# Patient Record
Sex: FEMALE | Race: WHITE | NOT HISPANIC OR LATINO | Employment: OTHER | URBAN - NONMETROPOLITAN AREA
[De-identification: names, ages, dates, MRNs, and addresses within clinical notes are randomized per-mention and may not be internally consistent; named-entity substitution may affect disease eponyms.]

---

## 2018-05-22 ENCOUNTER — OFFICE VISIT (OUTPATIENT)
Dept: URGENT CARE | Facility: CLINIC | Age: 63
End: 2018-05-22
Payer: COMMERCIAL

## 2018-05-22 VITALS
HEIGHT: 62 IN | HEART RATE: 73 BPM | DIASTOLIC BLOOD PRESSURE: 77 MMHG | TEMPERATURE: 96.7 F | BODY MASS INDEX: 20.32 KG/M2 | WEIGHT: 110.4 LBS | SYSTOLIC BLOOD PRESSURE: 144 MMHG | OXYGEN SATURATION: 98 %
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
## 2018-05-22 PROCEDURE — 10120 INC&RMVL FB SUBQ TISS SMPL: CPT | Performed by: NURSE PRACTITIONER

## 2018-05-22 PROCEDURE — 99203 OFFICE O/P NEW LOW 30 MIN: CPT | Performed by: NURSE PRACTITIONER

## 2018-05-22 RX ORDER — DOXYCYCLINE 100 MG/1
TABLET ORAL
Qty: 2 TABLET | Refills: 0 | Status: SHIPPED | OUTPATIENT
Start: 2018-05-22 | End: 2018-05-23

## 2018-05-22 NOTE — PROGRESS NOTES
[facility]        NAME: Dalton Mcfarland is a 58 y o  female  : 1955    MRN: [id number]  DATE: May 22, 2018  TIME: 4:24 PM    Assessment and Plan   Tick bite, initial encounter [W57  XXXA]  1  Tick bite, initial encounter  doxycycline (ADOXA) 100 MG tablet         Patient Instructions     Given prophylactic dose of doxycycline and instructed to follow up with PCP for Lyme titers in 3 weeks otherwise follow up PCP in 3-5 days  Follow up with PCP in 3-5 days  Proceed to  ER if symptoms worsen  Chief Complaint     Chief Complaint   Patient presents with    Tick Removal     behind left knee         History of Present Illness       44-year-old female presents urgent care with chief complaint of tick bite left posterior calf  Patient states take has been in since this morning so approximately 8 hours  Patient states she did attempt to removed tick and have broken off with partial tick remaining and leg  Denies any fevers chills body aches or rashes at this time  Review of Systems   Review of Systems   Constitutional: Negative  HENT: Negative  Eyes: Negative  Respiratory: Negative  Cardiovascular: Negative  Gastrointestinal: Negative  Endocrine: Negative  Genitourinary: Negative  Musculoskeletal: Negative  Skin: Positive for wound (tick left lower leg)  Allergic/Immunologic: Negative  Neurological: Negative  Hematological: Negative  Psychiatric/Behavioral: Negative  All other systems reviewed and are negative          Current Medications       Current Outpatient Prescriptions:     doxycycline (ADOXA) 100 MG tablet, Take 200mg 1 time dose only, Disp: 2 tablet, Rfl: 0    Current Allergies     Allergies as of 2018 - Reviewed 2018   Allergen Reaction Noted    Neomycin Rash 2018            The following portions of the patient's history were reviewed and updated as appropriate: allergies, current medications, past family history, past medical history, past social history, past surgical history and problem list      History reviewed  No pertinent past medical history  Past Surgical History:   Procedure Laterality Date    SMALL INTESTINE SURGERY         Family History   Problem Relation Age of Onset    No Known Problems Mother     No Known Problems Father          Medications have been verified  Objective   /77 (BP Location: Right arm, Patient Position: Sitting, Cuff Size: Standard)   Pulse 73   Temp (!) 96 7 °F (35 9 °C) (Tympanic)   Ht 5' 1 81" (1 57 m)   Wt 50 1 kg (110 lb 6 4 oz)   SpO2 98%   BMI 20 32 kg/m²        Physical Exam     Physical Exam   Constitutional: She is oriented to person, place, and time  Vital signs are normal  She appears well-developed  HENT:   Head: Normocephalic and atraumatic  Right Ear: External ear normal    Left Ear: External ear normal    Nose: Nose normal    Mouth/Throat: Oropharynx is clear and moist    Eyes: Conjunctivae and EOM are normal  Pupils are equal, round, and reactive to light  Lids are everted and swept, no foreign bodies found  Neck: Normal range of motion  Neck supple  Cardiovascular: Normal rate, regular rhythm, normal heart sounds and intact distal pulses  Pulmonary/Chest: Effort normal and breath sounds normal    Abdominal: Normal appearance  Musculoskeletal: Normal range of motion  Neurological: She is alert and oriented to person, place, and time  She has normal reflexes  Skin: Skin is warm, dry and intact  Psychiatric: She has a normal mood and affect  Her speech is normal and behavior is normal  Judgment and thought content normal    Nursing note and vitals reviewed

## 2018-05-22 NOTE — PATIENT INSTRUCTIONS
Given prophylactic dose of doxycycline provided wound care instructions for patient who verbalizes understanding and instructed to follow up with PCP for Lyme titers in 3 weeks

## 2019-03-25 ENCOUNTER — OFFICE VISIT (OUTPATIENT)
Dept: URGENT CARE | Facility: CLINIC | Age: 64
End: 2019-03-25
Payer: COMMERCIAL

## 2019-03-25 VITALS
WEIGHT: 110 LBS | OXYGEN SATURATION: 99 % | TEMPERATURE: 96.9 F | HEIGHT: 63 IN | SYSTOLIC BLOOD PRESSURE: 144 MMHG | RESPIRATION RATE: 20 BRPM | HEART RATE: 78 BPM | DIASTOLIC BLOOD PRESSURE: 86 MMHG | BODY MASS INDEX: 19.49 KG/M2

## 2019-03-25 DIAGNOSIS — H57.89 IRRITATION OF LEFT EYE: Primary | ICD-10-CM

## 2019-03-25 PROCEDURE — 99213 OFFICE O/P EST LOW 20 MIN: CPT | Performed by: NURSE PRACTITIONER

## 2019-03-25 NOTE — PROGRESS NOTES
[facility]        NAME: Cameron Poole is a 61 y o  female  : 1955    MRN: [id number]  DATE: 2019  TIME: 8:12 PM    Assessment and Plan   Irritation of left eye [H57 89]  1  Irritation of left eye           Patient Instructions   Continue using over-the-counter eyedrops p r n  Nikki Skipper Keep your follow-up appointment with the eye doctor next Monday  If you experience a significant change in symptoms such as decreased vision or increase in discomfort/pain, you should be rechecked  Follow up with PCP in 3-5 days  Proceed to  ER if symptoms worsen  Chief Complaint     Chief Complaint   Patient presents with    Eye Problem     redness and swelling of left eye for 1 5 weeks         History of Present Illness       Patient reports that her left eye has been bothering her for a week and a half  She and her  states that she has an eye appointment for next Monday but that was the soonest she can be seen  She states that initially it felt like something was in her, but now it just feels irritated  She reports that 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  She states that the eye and the tissue surrounding her eye feel swollen at present time  She reports that she has been using over-the-counter eyedrops with partial relief  She and her  states she has been monitored by her eye doctor for fluctuating eye pressures  She reports that some mornings she has a small amount of clearish white crusted discharge on her eye, but denies eye drainage throughout the day  She states that initially her eye was more reddened, but that is not now  She reports at times her vision seems blurry (on assessment here her vision with glasses on was within normal limits--R eye 20/25 and L eye 20/20)  She expresses concern regarding infection  Review of Systems   Review of Systems   HENT: Negative for congestion, postnasal drip, rhinorrhea, sinus pressure and sinus pain  Eyes: Positive for pain (swollen feeling) and discharge  Negative for photophobia, redness, itching and visual disturbance  Neurological: Negative for dizziness, weakness, light-headedness, numbness and headaches  Current Medications     No current outpatient medications on file  Current Allergies     Allergies as of 03/25/2019 - Reviewed 03/25/2019   Allergen Reaction Noted    Neomycin Rash 05/22/2018            The following portions of the patient's history were reviewed and updated as appropriate: allergies, current medications, past family history, past medical history, past social history, past surgical history and problem list      History reviewed  No pertinent past medical history  Past Surgical History:   Procedure Laterality Date    SMALL INTESTINE SURGERY      TUBAL LIGATION         Family History   Problem Relation Age of Onset    No Known Problems Mother     No Known Problems Father          Medications have been verified  Objective   /86   Pulse 78   Temp (!) 96 9 °F (36 1 °C) (Tympanic)   Resp 20   Ht 5' 3" (1 6 m)   Wt 49 9 kg (110 lb)   SpO2 99%   BMI 19 49 kg/m²        Physical Exam     Physical Exam   Constitutional: She is oriented to person, place, and time  She appears well-developed and well-nourished  No distress  HENT:   Head: Normocephalic and atraumatic  Right Ear: External ear normal    Left Ear: External ear normal    Nose: Nose normal    Mouth/Throat: No oropharyngeal exudate  Eyes: Pupils are equal, round, and reactive to light  Conjunctivae and lids are normal  Right eye exhibits no chemosis, no discharge, no exudate and no hordeolum  No foreign body present in the right eye  Left eye exhibits no chemosis, no discharge, no exudate and no hordeolum  No foreign body present in the left eye  Right conjunctiva is not injected  Right conjunctiva has no hemorrhage  Left conjunctiva is not injected  Left conjunctiva has no hemorrhage   No scleral icterus  Right eye exhibits nystagmus  Left eye exhibits nystagmus  Mild bilateral nystagmus noted  Patient denies dizziness or vertigo symptoms  Small, approximately 2 mm x 1 mm superficial reddened area on on left lower eyelid near lateral canthus  Patient does not remember if she had a stye here or if this area was previously more swollen  It looks mildly irritated; it is difficult to say whether this caused by irritation or if possible rubbing at her sleep caused this spot  With her eye closed, I very gently and slowly applied pressure  Patient did not note any change in the eye discomfort; no pain was elicited  No symptoms of active infection conjunctiva white, no drainage   Neck: Normal range of motion  Neck supple  Pulmonary/Chest: Effort normal  No respiratory distress  Abdominal: She exhibits no distension  Musculoskeletal: Normal range of motion  Neurological: She is alert and oriented to person, place, and time  Skin: Skin is warm and dry  She is not diaphoretic  Psychiatric: She has a normal mood and affect  Her behavior is normal  Judgment and thought content normal    Nursing note and vitals reviewed

## 2019-03-26 NOTE — PATIENT INSTRUCTIONS
Continue using over-the-counter eyedrops p r n Dara Soulier Keep your follow-up appointment with the eye doctor next Monday  If you experience a significant change in symptoms such as decreased vision or increase in discomfort/pain, you should be rechecked

## 2019-11-27 ENCOUNTER — OFFICE VISIT (OUTPATIENT)
Dept: URGENT CARE | Facility: CLINIC | Age: 64
End: 2019-11-27
Payer: COMMERCIAL

## 2019-11-27 ENCOUNTER — APPOINTMENT (OUTPATIENT)
Dept: RADIOLOGY | Facility: CLINIC | Age: 64
End: 2019-11-27
Payer: COMMERCIAL

## 2019-11-27 VITALS
RESPIRATION RATE: 18 BRPM | HEIGHT: 63 IN | DIASTOLIC BLOOD PRESSURE: 81 MMHG | WEIGHT: 110 LBS | TEMPERATURE: 98.6 F | BODY MASS INDEX: 19.49 KG/M2 | OXYGEN SATURATION: 99 % | HEART RATE: 88 BPM | SYSTOLIC BLOOD PRESSURE: 161 MMHG

## 2019-11-27 DIAGNOSIS — M79.672 LEFT FOOT PAIN: ICD-10-CM

## 2019-11-27 DIAGNOSIS — S90.32XA CONTUSION OF LEFT FOOT, INITIAL ENCOUNTER: Primary | ICD-10-CM

## 2019-11-27 PROCEDURE — 99213 OFFICE O/P EST LOW 20 MIN: CPT | Performed by: PHYSICIAN ASSISTANT

## 2019-11-27 PROCEDURE — 73630 X-RAY EXAM OF FOOT: CPT

## 2019-11-27 NOTE — PATIENT INSTRUCTIONS
No acute abnormality of Xray  Post op shoe given to be used as much as tolerated when weight bearing  Recommend rest, icing for 20 minutes at a time at least 4x daily, compression when possible, and elevating as much as possible  Take anti inflammatory such as advil or aleve to help with inflammation and discomfort  F/u with PCP if no improvement in 2-3 weeks

## 2019-11-27 NOTE — PROGRESS NOTES
[facility]        NAME: Ryan Doran is a 61 y o  female  : 1955    MRN: [id number]  DATE: 2019  TIME: 10:18 AM    Assessment and Plan   Contusion of left foot, initial encounter [S90 32XA]  1  Contusion of left foot, initial encounter  XR foot 3+ vw left    CANCELED: Post Op Shoe         Patient Instructions     Patient Instructions   No acute abnormality of Xray  Post op shoe given to be used as much as tolerated when weight bearing  Recommend rest, icing for 20 minutes at a time at least 4x daily, compression when possible, and elevating as much as possible  Take anti inflammatory such as advil or aleve to help with inflammation and discomfort  F/u with PCP if no improvement in 2-3 weeks    Called patient and left message regarding sclerosis noted by radiologist of 4th middle phalanx which could represent a slightly impacted fracture but of unknown age  Doubt acute fracture as this is not the area of her pain, recommended ignacio taping if she notes discomfort  Management otherwise would be the same  Follow up with PCP in 3-5 days  Proceed to  ER if symptoms worsen  Chief Complaint     Chief Complaint   Patient presents with    Foot Injury     left foot was bent back stepping of a fork lift a week ago          History of Present Illness       60 y/o F presents c/o L foot injury that occurred 6 days ago  States she was wearing steel toe boots 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, stepped down on toe causing boot to fold and steel tip to dig into top of foot  Did not fall  Has had pain since, not improving  Is able to wear weight  Pain is mostly on 2nd and 3rd toes as well as distal dorsal foot  Occasional tingling of area  Has been using ice and advil with some relief  No swelling or bruising      Review of Systems   Review of Systems   Constitutional: Negative for chills, fatigue and fever  Respiratory: Negative for cough and shortness of breath      Cardiovascular: Negative for chest pain and palpitations  Musculoskeletal: Positive for arthralgias and gait problem  Negative for joint swelling and myalgias  Skin: Negative for color change, pallor and rash  Neurological: Negative for weakness, light-headedness and numbness  Current Medications     No current outpatient medications on file  Current Allergies     Allergies as of 11/27/2019 - Reviewed 11/27/2019   Allergen Reaction Noted    Neomycin Rash 05/22/2018            The following portions of the patient's history were reviewed and updated as appropriate: allergies, current medications, past family history, past medical history, past social history, past surgical history and problem list      History reviewed  No pertinent past medical history  Past Surgical History:   Procedure Laterality Date    SMALL INTESTINE SURGERY      SMALL INTESTINE SURGERY      TUBAL LIGATION         Family History   Problem Relation Age of Onset    No Known Problems Mother     No Known Problems Father          Medications have been verified  Objective   /81   Pulse 88   Temp 98 6 °F (37 °C)   Resp 18   Ht 5' 3" (1 6 m)   Wt 49 9 kg (110 lb)   SpO2 99%   BMI 19 49 kg/m²        Physical Exam     Physical Exam   Constitutional: She appears well-developed and well-nourished  No distress  Musculoskeletal:   Tenderness of L 2nd and 3rd toes as well as distal dorsal foot  No swelling, heat or bruising  Sensation intact  Cap refill <2s of all digits  Able to wiggle toes  DP pulse 2+   Skin: She is not diaphoretic

## 2019-12-03 ENCOUNTER — TELEPHONE (OUTPATIENT)
Dept: OBGYN CLINIC | Facility: HOSPITAL | Age: 64
End: 2019-12-03

## 2019-12-03 ENCOUNTER — OFFICE VISIT (OUTPATIENT)
Dept: FAMILY MEDICINE CLINIC | Facility: CLINIC | Age: 64
End: 2019-12-03
Payer: COMMERCIAL

## 2019-12-03 VITALS
HEART RATE: 88 BPM | BODY MASS INDEX: 19.45 KG/M2 | WEIGHT: 109.8 LBS | HEIGHT: 63 IN | DIASTOLIC BLOOD PRESSURE: 90 MMHG | SYSTOLIC BLOOD PRESSURE: 170 MMHG | OXYGEN SATURATION: 97 %

## 2019-12-03 DIAGNOSIS — S92.902D CLOSED FRACTURE OF LEFT FOOT WITH ROUTINE HEALING, SUBSEQUENT ENCOUNTER: Primary | ICD-10-CM

## 2019-12-03 DIAGNOSIS — Z13.31 DEPRESSION SCREENING NEGATIVE: ICD-10-CM

## 2019-12-03 DIAGNOSIS — Z11.59 NEED FOR HEPATITIS C SCREENING TEST: ICD-10-CM

## 2019-12-03 DIAGNOSIS — R03.0 ELEVATED BLOOD PRESSURE READING: ICD-10-CM

## 2019-12-03 PROCEDURE — 4004F PT TOBACCO SCREEN RCVD TLK: CPT | Performed by: PHYSICIAN ASSISTANT

## 2019-12-03 PROCEDURE — 3008F BODY MASS INDEX DOCD: CPT | Performed by: PHYSICIAN ASSISTANT

## 2019-12-03 PROCEDURE — 99203 OFFICE O/P NEW LOW 30 MIN: CPT | Performed by: PHYSICIAN ASSISTANT

## 2019-12-03 NOTE — PROGRESS NOTES
Assessment/Plan:    Problem List Items Addressed This Visit     None      Visit Diagnoses     Closed fracture of left foot with routine healing, subsequent encounter    -  Primary    Relevant Orders    Ambulatory referral to Podiatry    Elevated blood pressure reading        Need for hepatitis C screening test        Relevant Orders    Hepatitis C antibody    Depression screening negative               Diagnoses and all orders for this visit:    Closed fracture of left foot with routine healing, subsequent encounter  -     Ambulatory referral to Podiatry; Future    Elevated blood pressure reading    Need for hepatitis C screening test  -     Hepatitis C antibody    Depression screening negative        I've placed some general restrictions on [place] for her employer, but will ultimately defer to podiatry  I've placed a referral to Dr Alexanrda Patel  As for her elevated blood pressure, I've asked Michelle Billingsley to stop in office next week for BP check  If continues to be elevated, would recommend HTN follow up appointment and would initiate anti-hypertensive medication  She does not have a BP cuff at home currently  Subjective:      Patient ID: Yuniel Capone is a 61 y o  female  Michelle Billingsley is here today as a new patient due to injury sustained at work 11/21/19  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, and top of steel-toe boots pushed onto top of foot, causing pain  Was evaluated by work nurse who felt it was bruising  She did not follow up with work physician  Symptoms did not improve (pain), so she was evaluated at [facility] 11/27/19  Xray of L foot shows possible fracture of L 4th middle phalanx, age indeterminate  Michelle Billingsley feels that currently, she is unable to safely work at full duty  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  At times, she is required to lift/carry 50 lbs and is on her feet for her entire shift   These activities all give her increased pain and swelling in L foot  Yuri Maki is asking for some work limitations and hopefully will be provided with light duty accommodations until healed, also requesting evaluation by specialist   As a side note while here, she is found to be hypertensive  Per patient, she is irritated/aggitated by her employer and is in pain with her foot  She feels that is why her BP is elevated  Was never hypertensive previously per patient  The following portions of the patient's history were reviewed and updated as appropriate:   She has no past medical history on file  ,  does not have any pertinent problems on file  ,   has a past surgical history that includes Small intestine surgery; Tubal ligation; and Small intestine surgery  ,  family history includes No Known Problems in her father and mother  ,   reports that she has been smoking  She has never used smokeless tobacco  She reports that she drinks alcohol  She reports that she does not use drugs  ,  is allergic to neomycin     No current outpatient medications on file  No current facility-administered medications for this visit  Review of Systems   Constitutional: Negative for activity change, appetite change, chills, diaphoresis, fatigue, fever and unexpected weight change  HENT: Negative for congestion, ear pain, postnasal drip, rhinorrhea, sinus pressure, sinus pain, sneezing, sore throat, tinnitus and voice change  Eyes: Negative for pain, redness and visual disturbance  Respiratory: Negative for cough, chest tightness, shortness of breath and wheezing  Cardiovascular: Negative for chest pain, palpitations and leg swelling  Gastrointestinal: Negative for abdominal pain, blood in stool, constipation, diarrhea, nausea and vomiting  Genitourinary: Negative for difficulty urinating, dysuria, frequency, hematuria and urgency  Musculoskeletal: Positive for arthralgias and gait problem  Negative for back pain, joint swelling, myalgias, neck pain and neck stiffness  Skin: Negative for color change, pallor, rash and wound  Neurological: Negative for dizziness, tremors, weakness, light-headedness and headaches  Psychiatric/Behavioral: Negative for dysphoric mood, self-injury, sleep disturbance and suicidal ideas  The patient is not nervous/anxious  Objective:  Vitals:    12/03/19 0903 12/03/19 0930   BP: (!) 178/88 170/90   BP Location: Left arm    Patient Position: Sitting    Pulse: 88    SpO2: 97%    Weight: 49 8 kg (109 lb 12 8 oz)    Height: 5' 3" (1 6 m)      Body mass index is 19 45 kg/m²  Physical Exam   Constitutional: She is oriented to person, place, and time  She appears well-developed and well-nourished  No distress  HENT:   Head: Normocephalic and atraumatic  Right Ear: Hearing, tympanic membrane, external ear and ear canal normal    Left Ear: Hearing, tympanic membrane, external ear and ear canal normal    Mouth/Throat: Uvula is midline, oropharynx is clear and moist and mucous membranes are normal  No oropharyngeal exudate  Eyes: Conjunctivae are normal  Right eye exhibits no discharge  Left eye exhibits no discharge  No scleral icterus  Neck: Neck supple  Carotid bruit is not present  No thyromegaly present  Cardiovascular: Normal rate, regular rhythm and normal heart sounds  No murmur heard  Pulmonary/Chest: Effort normal and breath sounds normal  No respiratory distress  She has no wheezes  Abdominal: Soft  Bowel sounds are normal  She exhibits no distension and no mass  There is no tenderness  There is no rebound and no guarding  Musculoskeletal: Normal range of motion  She exhibits no edema  Left foot: There is tenderness  Feet:    Some bruising present over area of tenderness, no swelling on exam   Lymphadenopathy:     She has no cervical adenopathy  Neurological: She is alert and oriented to person, place, and time  Skin: Skin is warm and dry  No rash noted  She is not diaphoretic  No erythema  Psychiatric: She has a normal mood and affect  Her behavior is normal  Judgment and thought content normal    Vitals reviewed  PHQ-9 Depression Screening    PHQ-9:    Frequency of the following problems over the past two weeks:       Little interest or pleasure in doing things:  0 - not at all  Feeling down, depressed, or hopeless:  0 - not at all  PHQ-2 Score:  0         Tobacco Cessation Counseling: Tobacco cessation counseling and education was provided  The patient is sincerely urged to quit consumption of tobacco  She is not ready to quit tobacco  The numerous health risks of tobacco consumption were discussed  If she decides to quit, there are a number of helpful adjunctive aids, and she can see me to discuss nicotine replacement therapy, chantix, or bupropion anytime in the future

## 2019-12-03 NOTE — TELEPHONE ENCOUNTER
Patient lvm stating that she needed an appt asap for a fx 4th toe because her employer will not let her come back  I called the patient to schedule the appt & lvm for her to return our call

## 2019-12-03 NOTE — LETTER
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

## 2019-12-04 ENCOUNTER — TELEPHONE (OUTPATIENT)
Dept: FAMILY MEDICINE CLINIC | Facility: CLINIC | Age: 64
End: 2019-12-04

## 2019-12-04 NOTE — TELEPHONE ENCOUNTER
THIS HAPPENED AT WORK  PT WAS NOT TOLD THE PROPER CHANNELS IN THE BEGINNING  NOW THEY TOLD HER SHE HAS TO SEE THE WORKMAN'S COMP DOCTOR FOR AT LEAST 80 DAYS, AFTER THAT SHE CAN GO WHEREVER SHE WANTS  IF SHE IS NOT SATISFIED AFTER THE 90 DAYS SHE WILL CALL DR Merlene Gamino OFFICE FOR APPT   IT'S NOT THAT SHE DID NOT WANT TO GO

## 2020-02-06 ENCOUNTER — TELEPHONE (OUTPATIENT)
Dept: FAMILY MEDICINE CLINIC | Facility: CLINIC | Age: 65
End: 2020-02-06

## 2020-02-11 ENCOUNTER — APPOINTMENT (OUTPATIENT)
Dept: LAB | Facility: CLINIC | Age: 65
End: 2020-02-11
Payer: COMMERCIAL

## 2020-02-11 ENCOUNTER — OFFICE VISIT (OUTPATIENT)
Dept: FAMILY MEDICINE CLINIC | Facility: CLINIC | Age: 65
End: 2020-02-11
Payer: COMMERCIAL

## 2020-02-11 VITALS
DIASTOLIC BLOOD PRESSURE: 82 MMHG | WEIGHT: 109.8 LBS | HEART RATE: 73 BPM | HEIGHT: 63 IN | SYSTOLIC BLOOD PRESSURE: 122 MMHG | BODY MASS INDEX: 19.45 KG/M2 | TEMPERATURE: 97 F | OXYGEN SATURATION: 97 %

## 2020-02-11 DIAGNOSIS — Z12.31 ENCOUNTER FOR SCREENING MAMMOGRAM FOR BREAST CANCER: ICD-10-CM

## 2020-02-11 DIAGNOSIS — J01.90 ACUTE NON-RECURRENT SINUSITIS, UNSPECIFIED LOCATION: Primary | ICD-10-CM

## 2020-02-11 DIAGNOSIS — Z13.31 DEPRESSION SCREENING NEGATIVE: ICD-10-CM

## 2020-02-11 DIAGNOSIS — Z12.11 SCREENING FOR COLON CANCER: ICD-10-CM

## 2020-02-11 PROBLEM — S92.912D CLOSED NONDISPLACED FRACTURE OF PHALANX OF TOE OF LEFT FOOT WITH ROUTINE HEALING: Status: ACTIVE | Noted: 2019-12-05

## 2020-02-11 PROBLEM — S90.122A CONTUSION OF FOURTH TOE OF LEFT FOOT: Status: ACTIVE | Noted: 2019-12-05

## 2020-02-11 PROBLEM — S93.525A: Status: ACTIVE | Noted: 2019-12-19

## 2020-02-11 LAB — HCV AB SER QL: NORMAL

## 2020-02-11 PROCEDURE — 99214 OFFICE O/P EST MOD 30 MIN: CPT | Performed by: PHYSICIAN ASSISTANT

## 2020-02-11 PROCEDURE — 3008F BODY MASS INDEX DOCD: CPT | Performed by: PHYSICIAN ASSISTANT

## 2020-02-11 PROCEDURE — 4004F PT TOBACCO SCREEN RCVD TLK: CPT | Performed by: PHYSICIAN ASSISTANT

## 2020-02-11 PROCEDURE — 86803 HEPATITIS C AB TEST: CPT | Performed by: PHYSICIAN ASSISTANT

## 2020-02-11 PROCEDURE — 36415 COLL VENOUS BLD VENIPUNCTURE: CPT | Performed by: PHYSICIAN ASSISTANT

## 2020-02-11 RX ORDER — AMOXICILLIN 500 MG/1
500 CAPSULE ORAL EVERY 8 HOURS SCHEDULED
Qty: 30 CAPSULE | Refills: 0 | Status: SHIPPED | OUTPATIENT
Start: 2020-02-11 | End: 2020-02-21

## 2020-02-11 NOTE — PROGRESS NOTES
Assessment/Plan:    Problem List Items Addressed This Visit     None      Visit Diagnoses     Acute non-recurrent sinusitis, unspecified location    -  Primary    Relevant Medications    amoxicillin (AMOXIL) 500 mg capsule    Screening for colon cancer        Relevant Orders    Occult Blood, Fecal Immunochemical    Encounter for screening mammogram for breast cancer        Relevant Orders    Mammo screening bilateral w 3d & cad    Depression screening negative               Diagnoses and all orders for this visit:    Acute non-recurrent sinusitis, unspecified location  -     amoxicillin (AMOXIL) 500 mg capsule; Take 1 capsule (500 mg total) by mouth every 8 (eight) hours for 10 days    Screening for colon cancer  -     Occult Blood, Fecal Immunochemical; Future    Encounter for screening mammogram for breast cancer  -     Mammo screening bilateral w 3d & cad; Future    Depression screening negative        No problem-specific Assessment & Plan notes found for this encounter  Subjective:      Patient ID: Rafael Low is a 59 y o  female  P[employer]illa [employer] is here today complaining of sinus symptoms x 2 weeks  The following portions of the patient's history were reviewed and updated as appropriate:   She has no past medical history on file  ,  does not have any pertinent problems on file  ,   has a past surgical history that includes Small intestine surgery; Tubal ligation; and Small intestine surgery  ,  family history includes No Known Problems in her father and mother  ,   reports that she has been smoking  She has never used smokeless tobacco  She reports that she drinks alcohol  She reports that she does not use drugs  ,  is allergic to neomycin     Current Outpatient Medications   Medication Sig Dispense Refill    amoxicillin (AMOXIL) 500 mg capsule Take 1 capsule (500 mg total) by mouth every 8 (eight) hours for 10 days 30 capsule 0     No current facility-administered medications for this visit          Review of Systems   Constitutional: Negative for activity change, appetite change, chills, diaphoresis, fatigue, fever and unexpected weight change  HENT: Positive for sinus pressure and sinus pain  Negative for congestion, ear pain, postnasal drip, rhinorrhea, sneezing, sore throat, tinnitus and voice change  Eyes: Negative for pain, redness and visual disturbance  Respiratory: Negative for cough, chest tightness, shortness of breath and wheezing  Cardiovascular: Negative for chest pain, palpitations and leg swelling  Gastrointestinal: Negative for abdominal pain, blood in stool, constipation, diarrhea, nausea and vomiting  Genitourinary: Negative for difficulty urinating, dysuria, frequency, hematuria and urgency  Musculoskeletal: Negative for arthralgias, back pain, gait problem, joint swelling, myalgias, neck pain and neck stiffness  Skin: Negative for color change, pallor, rash and wound  Neurological: Negative for dizziness, tremors, weakness, light-headedness and headaches  Psychiatric/Behavioral: Negative for dysphoric mood, self-injury, sleep disturbance and suicidal ideas  The patient is not nervous/anxious  Objective:  Vitals:    02/11/20 0822 02/11/20 0850   BP: 150/86 122/82   BP Location: Left arm    Patient Position: Sitting    Pulse: 73    Temp: (!) 97 °F (36 1 °C)    SpO2: 97%    Weight: 49 8 kg (109 lb 12 8 oz)    Height: 5' 3" (1 6 m)      Body mass index is 19 45 kg/m²  PHQ-9 Depression Screening    PHQ-9:    Frequency of the following problems over the past two weeks:       Little interest or pleasure in doing things:  0 - not at all  Feeling down, depressed, or hopeless:  0 - not at all  PHQ-2 Score:  0          Physical Exam   Constitutional: She is oriented to person, place, and time  She appears well-developed and well-nourished  No distress  HENT:   Head: Normocephalic and atraumatic     Right Ear: Hearing, tympanic membrane, external ear and ear canal normal  Left Ear: Hearing, tympanic membrane, external ear and ear canal normal    Nose: Mucosal edema and rhinorrhea present  Mouth/Throat: Uvula is midline, oropharynx is clear and moist and mucous membranes are normal  No oropharyngeal exudate  No tonsillar exudate  Eyes: Conjunctivae are normal  Right eye exhibits no discharge  Left eye exhibits no discharge  No scleral icterus  Neck: Neck supple  Carotid bruit is not present  No thyromegaly present  Cardiovascular: Normal rate, regular rhythm and normal heart sounds  No murmur heard  Pulmonary/Chest: Effort normal and breath sounds normal  No respiratory distress  She has no wheezes  Abdominal: Soft  Bowel sounds are normal  She exhibits no distension and no mass  There is no tenderness  There is no rebound and no guarding  Musculoskeletal: Normal range of motion  She exhibits no edema or tenderness  Lymphadenopathy:     She has no cervical adenopathy  Neurological: She is alert and oriented to person, place, and time  Skin: Skin is warm and dry  No rash noted  She is not diaphoretic  No erythema  Psychiatric: She has a normal mood and affect  Her behavior is normal  Judgment and thought content normal    Vitals reviewed

## 2020-03-02 ENCOUNTER — HOSPITAL ENCOUNTER (OUTPATIENT)
Dept: MAMMOGRAPHY | Facility: HOSPITAL | Age: 65
Discharge: HOME | End: 2020-03-02
Payer: COMMERCIAL

## 2020-03-02 VITALS — WEIGHT: 109 LBS | BODY MASS INDEX: 19.31 KG/M2 | HEIGHT: 63 IN

## 2020-03-02 DIAGNOSIS — Z12.31 ENCOUNTER FOR SCREENING MAMMOGRAM FOR BREAST CANCER: ICD-10-CM

## 2020-03-02 DIAGNOSIS — R92.8 ABNORMAL MAMMOGRAM OF LEFT BREAST: Primary | ICD-10-CM

## 2020-03-02 PROCEDURE — 77067 SCR MAMMO BI INCL CAD: CPT

## 2020-03-02 PROCEDURE — 77063 BREAST TOMOSYNTHESIS BI: CPT

## 2020-08-20 ENCOUNTER — HOSPITAL ENCOUNTER (OUTPATIENT)
Dept: ULTRASOUND IMAGING | Facility: HOSPITAL | Age: 65
Discharge: HOME | End: 2020-08-20

## 2020-08-20 ENCOUNTER — HOSPITAL ENCOUNTER (OUTPATIENT)
Dept: MAMMOGRAPHY | Facility: HOSPITAL | Age: 65
Discharge: HOME | End: 2020-08-20
Payer: COMMERCIAL

## 2020-08-20 VITALS — WEIGHT: 109 LBS | HEIGHT: 63 IN | BODY MASS INDEX: 19.31 KG/M2

## 2020-08-20 DIAGNOSIS — R92.8 ABNORMAL MAMMOGRAM OF LEFT BREAST: ICD-10-CM

## 2020-08-20 PROCEDURE — 77065 DX MAMMO INCL CAD UNI: CPT

## 2020-08-20 PROCEDURE — G0279 TOMOSYNTHESIS, MAMMO: HCPCS

## 2020-10-28 ENCOUNTER — OFFICE VISIT (OUTPATIENT)
Dept: FAMILY MEDICINE CLINIC | Facility: CLINIC | Age: 65
End: 2020-10-28
Payer: COMMERCIAL

## 2020-10-28 VITALS
SYSTOLIC BLOOD PRESSURE: 146 MMHG | WEIGHT: 108.4 LBS | HEART RATE: 86 BPM | TEMPERATURE: 96 F | HEIGHT: 63 IN | OXYGEN SATURATION: 98 % | DIASTOLIC BLOOD PRESSURE: 84 MMHG | BODY MASS INDEX: 19.21 KG/M2

## 2020-10-28 DIAGNOSIS — Z72.0 TOBACCO ABUSE: ICD-10-CM

## 2020-10-28 DIAGNOSIS — R49.9 HOARSENESS OR CHANGING VOICE: Primary | ICD-10-CM

## 2020-10-28 PROCEDURE — 99213 OFFICE O/P EST LOW 20 MIN: CPT | Performed by: PHYSICIAN ASSISTANT

## 2020-10-28 PROCEDURE — 3725F SCREEN DEPRESSION PERFORMED: CPT | Performed by: PHYSICIAN ASSISTANT

## 2020-11-03 ENCOUNTER — OFFICE VISIT (OUTPATIENT)
Dept: OTOLARYNGOLOGY | Facility: CLINIC | Age: 65
End: 2020-11-03
Payer: COMMERCIAL

## 2020-11-03 ENCOUNTER — APPOINTMENT (OUTPATIENT)
Dept: LAB | Facility: MEDICAL CENTER | Age: 65
End: 2020-11-03
Payer: COMMERCIAL

## 2020-11-03 VITALS
OXYGEN SATURATION: 93 % | SYSTOLIC BLOOD PRESSURE: 144 MMHG | WEIGHT: 105 LBS | HEART RATE: 87 BPM | HEIGHT: 63 IN | TEMPERATURE: 97.1 F | BODY MASS INDEX: 18.61 KG/M2 | DIASTOLIC BLOOD PRESSURE: 80 MMHG

## 2020-11-03 DIAGNOSIS — R49.9 HOARSENESS OR CHANGING VOICE: ICD-10-CM

## 2020-11-03 DIAGNOSIS — Z72.0 TOBACCO ABUSE: ICD-10-CM

## 2020-11-03 DIAGNOSIS — J38.00 VOCAL CORD PARALYSIS: Primary | ICD-10-CM

## 2020-11-03 DIAGNOSIS — J38.00 VOCAL CORD PARALYSIS: ICD-10-CM

## 2020-11-03 LAB
ANION GAP SERPL CALCULATED.3IONS-SCNC: 2 MMOL/L (ref 4–13)
BUN SERPL-MCNC: 11 MG/DL (ref 5–25)
CALCIUM SERPL-MCNC: 9.1 MG/DL (ref 8.3–10.1)
CHLORIDE SERPL-SCNC: 108 MMOL/L (ref 100–108)
CO2 SERPL-SCNC: 28 MMOL/L (ref 21–32)
CREAT SERPL-MCNC: 0.8 MG/DL (ref 0.6–1.3)
GFR SERPL CREATININE-BSD FRML MDRD: 78 ML/MIN/1.73SQ M
GLUCOSE SERPL-MCNC: 118 MG/DL (ref 65–140)
POTASSIUM SERPL-SCNC: 4.5 MMOL/L (ref 3.5–5.3)
SODIUM SERPL-SCNC: 138 MMOL/L (ref 136–145)

## 2020-11-03 PROCEDURE — 99243 OFF/OP CNSLTJ NEW/EST LOW 30: CPT | Performed by: OTOLARYNGOLOGY

## 2020-11-03 PROCEDURE — 80048 BASIC METABOLIC PNL TOTAL CA: CPT

## 2020-11-03 PROCEDURE — 36415 COLL VENOUS BLD VENIPUNCTURE: CPT

## 2020-11-03 PROCEDURE — 31575 DIAGNOSTIC LARYNGOSCOPY: CPT | Performed by: OTOLARYNGOLOGY

## 2020-11-09 ENCOUNTER — HOSPITAL ENCOUNTER (OUTPATIENT)
Dept: CT IMAGING | Facility: HOSPITAL | Age: 65
Discharge: HOME | End: 2020-11-09
Payer: COMMERCIAL

## 2020-11-09 DIAGNOSIS — R49.9 HOARSENESS OR CHANGING VOICE: ICD-10-CM

## 2020-11-09 DIAGNOSIS — J38.00 VOCAL CORD PARALYSIS: ICD-10-CM

## 2020-11-09 DIAGNOSIS — Z72.0 TOBACCO ABUSE: ICD-10-CM

## 2020-11-09 PROCEDURE — 70491 CT SOFT TISSUE NECK W/DYE: CPT

## 2020-11-09 PROCEDURE — G1004 CDSM NDSC: HCPCS

## 2020-11-09 PROCEDURE — 71260 CT THORAX DX C+: CPT

## 2020-11-09 RX ADMIN — IOHEXOL 85 ML: 350 INJECTION, SOLUTION INTRAVENOUS at 15:20

## 2020-11-13 ENCOUNTER — OFFICE VISIT (OUTPATIENT)
Dept: OTOLARYNGOLOGY | Facility: CLINIC | Age: 65
End: 2020-11-13
Payer: COMMERCIAL

## 2020-11-13 VITALS
BODY MASS INDEX: 18.96 KG/M2 | SYSTOLIC BLOOD PRESSURE: 150 MMHG | DIASTOLIC BLOOD PRESSURE: 90 MMHG | HEART RATE: 88 BPM | TEMPERATURE: 97.6 F | OXYGEN SATURATION: 94 % | HEIGHT: 63 IN | WEIGHT: 107 LBS

## 2020-11-13 DIAGNOSIS — Z72.0 TOBACCO ABUSE: ICD-10-CM

## 2020-11-13 DIAGNOSIS — R49.9 HOARSENESS OR CHANGING VOICE: ICD-10-CM

## 2020-11-13 DIAGNOSIS — J38.00 VOCAL CORD PARALYSIS: Primary | ICD-10-CM

## 2020-11-13 DIAGNOSIS — R91.8 MASS OF LEFT LUNG: ICD-10-CM

## 2020-11-13 PROCEDURE — 99213 OFFICE O/P EST LOW 20 MIN: CPT | Performed by: OTOLARYNGOLOGY

## 2020-11-13 PROCEDURE — 3008F BODY MASS INDEX DOCD: CPT | Performed by: PHYSICIAN ASSISTANT

## 2020-11-13 PROCEDURE — 31575 DIAGNOSTIC LARYNGOSCOPY: CPT | Performed by: OTOLARYNGOLOGY

## 2020-11-16 ENCOUNTER — TELEPHONE (OUTPATIENT)
Dept: CARDIAC SURGERY | Facility: CLINIC | Age: 65
End: 2020-11-16

## 2020-11-20 ENCOUNTER — TELEPHONE (OUTPATIENT)
Dept: GYNECOLOGIC ONCOLOGY | Facility: CLINIC | Age: 65
End: 2020-11-20

## 2020-11-23 ENCOUNTER — TELEPHONE (OUTPATIENT)
Dept: CARDIAC SURGERY | Facility: CLINIC | Age: 65
End: 2020-11-23

## 2021-01-01 ENCOUNTER — IMMUNIZATIONS (OUTPATIENT)
Dept: FAMILY MEDICINE CLINIC | Facility: HOSPITAL | Age: 66
End: 2021-01-01

## 2021-01-01 ENCOUNTER — OFFICE VISIT (OUTPATIENT)
Dept: FAMILY MEDICINE CLINIC | Facility: CLINIC | Age: 66
End: 2021-01-01
Payer: MEDICARE

## 2021-01-01 ENCOUNTER — TELEPHONE (OUTPATIENT)
Dept: FAMILY MEDICINE CLINIC | Facility: CLINIC | Age: 66
End: 2021-01-01

## 2021-01-01 ENCOUNTER — OFFICE VISIT (OUTPATIENT)
Dept: URGENT CARE | Facility: CLINIC | Age: 66
End: 2021-01-01
Payer: MEDICARE

## 2021-01-01 VITALS
OXYGEN SATURATION: 99 % | TEMPERATURE: 98 F | DIASTOLIC BLOOD PRESSURE: 70 MMHG | HEIGHT: 63 IN | WEIGHT: 102 LBS | RESPIRATION RATE: 18 BRPM | SYSTOLIC BLOOD PRESSURE: 130 MMHG | BODY MASS INDEX: 18.07 KG/M2 | HEART RATE: 88 BPM

## 2021-01-01 VITALS
SYSTOLIC BLOOD PRESSURE: 122 MMHG | HEIGHT: 63 IN | DIASTOLIC BLOOD PRESSURE: 76 MMHG | OXYGEN SATURATION: 95 % | BODY MASS INDEX: 18.07 KG/M2 | HEART RATE: 78 BPM | TEMPERATURE: 97.8 F | WEIGHT: 102 LBS

## 2021-01-01 DIAGNOSIS — Z23 ENCOUNTER FOR IMMUNIZATION: Primary | ICD-10-CM

## 2021-01-01 DIAGNOSIS — B02.30 HERPES ZOSTER WITH OPHTHALMIC COMPLICATION, UNSPECIFIED HERPES ZOSTER EYE DISEASE: Primary | ICD-10-CM

## 2021-01-01 DIAGNOSIS — Z23 NEED FOR VACCINATION: ICD-10-CM

## 2021-01-01 DIAGNOSIS — Z00.00 WELCOME TO MEDICARE PREVENTIVE VISIT: Primary | ICD-10-CM

## 2021-01-01 PROCEDURE — 99213 OFFICE O/P EST LOW 20 MIN: CPT | Performed by: PHYSICIAN ASSISTANT

## 2021-01-01 PROCEDURE — 1123F ACP DISCUSS/DSCN MKR DOCD: CPT | Performed by: PHYSICIAN ASSISTANT

## 2021-01-01 PROCEDURE — 0012A SARS-COV-2 / COVID-19 MRNA VACCINE (MODERNA) 100 MCG: CPT

## 2021-01-01 PROCEDURE — 91301 SARS-COV-2 / COVID-19 MRNA VACCINE (MODERNA) 100 MCG: CPT

## 2021-01-01 PROCEDURE — 90670 PCV13 VACCINE IM: CPT | Performed by: PHYSICIAN ASSISTANT

## 2021-01-01 PROCEDURE — G0463 HOSPITAL OUTPT CLINIC VISIT: HCPCS | Performed by: PHYSICIAN ASSISTANT

## 2021-01-01 PROCEDURE — G0403 EKG FOR INITIAL PREVENT EXAM: HCPCS | Performed by: PHYSICIAN ASSISTANT

## 2021-01-01 PROCEDURE — G0402 INITIAL PREVENTIVE EXAM: HCPCS | Performed by: PHYSICIAN ASSISTANT

## 2021-01-01 PROCEDURE — G0009 ADMIN PNEUMOCOCCAL VACCINE: HCPCS | Performed by: PHYSICIAN ASSISTANT

## 2021-01-01 RX ORDER — ALBUTEROL SULFATE 90 UG/1
2 AEROSOL, METERED RESPIRATORY (INHALATION) EVERY 6 HOURS PRN
COMMUNITY
Start: 2021-01-01 | End: 2022-10-14

## 2021-01-01 RX ORDER — BUPROPION HYDROCHLORIDE 150 MG/1
150 TABLET, EXTENDED RELEASE ORAL DAILY
COMMUNITY
Start: 2021-01-01 | End: 2022-10-14

## 2021-01-01 RX ORDER — VALACYCLOVIR HYDROCHLORIDE 1 G/1
1000 TABLET, FILM COATED ORAL 3 TIMES DAILY
Qty: 30 TABLET | Refills: 0 | Status: SHIPPED | OUTPATIENT
Start: 2021-01-01 | End: 2021-01-01

## 2021-01-06 ENCOUNTER — TELEPHONE (OUTPATIENT)
Dept: OTOLARYNGOLOGY | Facility: CLINIC | Age: 66
End: 2021-01-06

## 2021-01-06 NOTE — TELEPHONE ENCOUNTER
Wendi Leonard called to cancel Carlota's appointment for 1/29/21 at [place] [address]      They will call back to reschedule after covid

## 2021-03-15 ENCOUNTER — OFFICE VISIT (OUTPATIENT)
Dept: FAMILY MEDICINE CLINIC | Facility: CLINIC | Age: 66
End: 2021-03-15
Payer: MEDICARE

## 2021-03-15 VITALS
HEIGHT: 63 IN | OXYGEN SATURATION: 97 % | SYSTOLIC BLOOD PRESSURE: 116 MMHG | WEIGHT: 100.4 LBS | HEART RATE: 89 BPM | TEMPERATURE: 97.4 F | DIASTOLIC BLOOD PRESSURE: 78 MMHG | BODY MASS INDEX: 17.79 KG/M2

## 2021-03-15 DIAGNOSIS — C34.12 PRIMARY MALIGNANT NEOPLASM OF LEFT UPPER LOBE OF LUNG (HCC): ICD-10-CM

## 2021-03-15 DIAGNOSIS — T45.1X5A ADVERSE EFFECT OF CHEMOTHERAPY, INITIAL ENCOUNTER: Primary | ICD-10-CM

## 2021-03-15 DIAGNOSIS — C34.31 PRIMARY CANCER OF RIGHT LOWER LOBE OF LUNG (HCC): ICD-10-CM

## 2021-03-15 DIAGNOSIS — Z72.0 TOBACCO ABUSE: ICD-10-CM

## 2021-03-15 PROBLEM — S90.32XA CONTUSION OF LEFT FOOT: Status: RESOLVED | Noted: 2019-11-27 | Resolved: 2021-03-15

## 2021-03-15 PROBLEM — Z51.89 CONVALESCENCE FOLLOWING CHEMOTHERAPY: Status: ACTIVE | Noted: 2020-12-31

## 2021-03-15 PROBLEM — S90.122A CONTUSION OF FOURTH TOE OF LEFT FOOT: Status: RESOLVED | Noted: 2019-12-05 | Resolved: 2021-03-15

## 2021-03-15 PROBLEM — S92.912D CLOSED NONDISPLACED FRACTURE OF PHALANX OF TOE OF LEFT FOOT WITH ROUTINE HEALING: Status: RESOLVED | Noted: 2019-12-05 | Resolved: 2021-03-15

## 2021-03-15 PROBLEM — S93.525A: Status: RESOLVED | Noted: 2019-12-19 | Resolved: 2021-03-15

## 2021-03-15 PROCEDURE — 99213 OFFICE O/P EST LOW 20 MIN: CPT | Performed by: PHYSICIAN ASSISTANT

## 2021-03-15 RX ORDER — MIRTAZAPINE 15 MG/1
15 TABLET, FILM COATED ORAL
COMMUNITY
Start: 2021-02-25 | End: 2022-01-01

## 2021-03-15 RX ORDER — MEGESTROL ACETATE 125 MG/ML
625 SUSPENSION ORAL DAILY
COMMUNITY
Start: 2020-12-31

## 2021-03-15 RX ORDER — VARENICLINE TARTRATE 0.5 MG/1
0.5 TABLET, FILM COATED ORAL 2 TIMES DAILY
COMMUNITY

## 2021-03-15 RX ORDER — PROCHLORPERAZINE MALEATE 10 MG
10 TABLET ORAL EVERY 6 HOURS PRN
COMMUNITY
Start: 2021-01-05

## 2021-03-15 RX ORDER — ST. JOHN'S WORT 300 MG
1 CAPSULE ORAL DAILY
COMMUNITY

## 2021-03-15 RX ORDER — DOCUSATE SODIUM 100 MG/1
100 CAPSULE, LIQUID FILLED ORAL 2 TIMES DAILY
COMMUNITY

## 2021-03-15 RX ORDER — SENNA PLUS 8.6 MG/1
1 TABLET ORAL DAILY
COMMUNITY

## 2021-03-15 NOTE — PROGRESS NOTES
BMI Counseling: Body mass index is 17 79 kg/m²  Follow-up plan was not completed due to patient receiving palliative care  Assessment/Plan:    Problem List Items Addressed This Visit        Respiratory    [facility] )    Primary malignant neoplasm of left upper lobe of lung ([facility] )       Other    Tobacco abuse      Other Visit Diagnoses     Adverse effect of chemotherapy, initial encounter    -  Primary           Diagnoses and all orders for this visit:    Adverse effect of chemotherapy, initial encounter    Primary malignant neoplasm of left upper lobe of lung ([facility] )    Primary cancer of right lower lobe of lung ([facility] )    Tobacco abuse    Other orders  -     varenicline (CHANTIX) 0 5 mg tablet; Take 0 5 mg by mouth 2 (two) times a day  -     senna (Senna-Time) 8 6 MG tablet; Take 1 tablet by mouth daily  -     prochlorperazine (COMPAZINE) 10 mg tablet; Take 10 mg by mouth every 6 (six) hours as needed  -     mirtazapine (REMERON) 15 mg tablet; Take 15 mg by mouth  -     megestrol (MEGACE ES) 625 MG/5ML suspension; Take 625 mg by mouth daily  -     St Johns Wort 300 MG CAPS; Take 1 capsule by mouth daily  -     docusate sodium (Colace) 100 mg capsule; Take 100 mg by mouth 2 (two) times a day        Symptoms likely from chemotherapy instead of Chantix  She will continue Chantix  Pt is in agreement  Subjective:      Patient ID: Howard Garcia is a 72 y o  female  Darshana Reddy is here today at her 's insistence, he feels that since starting Chantix 2 weeks ago she has been increasingly forgetful/irritable  Darshana Reddy just finished a cycle of radiation of chemotherapy for lung CA and is about to restart another cycle on her other lung  She does not believe that she is irritable or forgetful  Since starting Chantix, has been able to decrease smoking to 5 cigarettes per day   She understands the need to be able to successfully quit tobacco use and that Chantix will give her the best chance of quitting  She does not report any adverse reactions with Chantix  The following portions of the patient's history were reviewed and updated as appropriate:   She has a past medical history of Mass of left lung ,  does not have any pertinent problems on file  ,   has a past surgical history that includes Small intestine surgery; Tubal ligation; and Small intestine surgery  ,  family history includes Brain cancer in her father; Breast cancer (age of onset: 54) in her sister; Cancer in her paternal grandmother; Early death in her paternal aunt; No Known Problems in her maternal aunt, maternal aunt, maternal grandfather, maternal grandmother, mother, paternal grandfather, sister, sister, and sister; Prostate cancer in her father; Skin cancer in her father  ,   reports that she has been smoking  She has never used smokeless tobacco  She reports current alcohol use  She reports that she does not use drugs  ,  is allergic to succinylcholine; medical tape; and neomycin     Current Outpatient Medications   Medication Sig Dispense Refill    docusate sodium (Colace) 100 mg capsule Take 100 mg by mouth 2 (two) times a day      megestrol (MEGACE ES) 625 MG/5ML suspension Take 625 mg by mouth daily      mirtazapine (REMERON) 15 mg tablet Take 15 mg by mouth      prochlorperazine (COMPAZINE) 10 mg tablet Take 10 mg by mouth every 6 (six) hours as needed      senna (Senna-Time) 8 6 MG tablet Take 1 tablet by mouth daily      St Johns Wort 300 MG CAPS Take 1 capsule by mouth daily      varenicline (CHANTIX) 0 5 mg tablet Take 0 5 mg by mouth 2 (two) times a day       No current facility-administered medications for this visit  Review of Systems   Constitutional: Positive for fatigue  Negative for activity change, appetite change, chills, diaphoresis, fever and unexpected weight change     HENT: Negative for congestion, ear pain, postnasal drip, rhinorrhea, sinus pressure, sinus pain, sneezing, sore throat, tinnitus and voice change  Eyes: Negative for pain, redness and visual disturbance  Respiratory: Negative for cough, chest tightness, shortness of breath and wheezing  Cardiovascular: Negative for chest pain, palpitations and leg swelling  Gastrointestinal: Negative for abdominal pain, blood in stool, constipation, diarrhea, nausea and vomiting  Genitourinary: Negative for difficulty urinating, dysuria, frequency, hematuria and urgency  Musculoskeletal: Negative for arthralgias, back pain, gait problem, joint swelling, myalgias, neck pain and neck stiffness  Skin: Negative for color change, pallor, rash and wound  Neurological: Positive for weakness  Negative for dizziness, tremors, light-headedness and headaches  Psychiatric/Behavioral: Negative for dysphoric mood, self-injury, sleep disturbance and suicidal ideas  The patient is not nervous/anxious  Objective:  Vitals:    03/15/21 1146   BP: 116/78   Pulse: 89   Temp: (!) 97 4 °F (36 3 °C)   SpO2: 97%   Weight: 45 5 kg (100 lb 6 4 oz)   Height: 5' 3" (1 6 m)     Body mass index is 17 79 kg/m²  Physical Exam  Vitals signs reviewed  Constitutional:       General: She is not in acute distress  Appearance: Normal appearance  She is well-developed  She is cachectic  She is ill-appearing  She is not diaphoretic  HENT:      Head: Normocephalic and atraumatic  Right Ear: Hearing, tympanic membrane, ear canal and external ear normal       Left Ear: Hearing, tympanic membrane, ear canal and external ear normal       Mouth/Throat:      Pharynx: Uvula midline  No oropharyngeal exudate  Eyes:      General: No scleral icterus  Right eye: No discharge  Left eye: No discharge  Conjunctiva/sclera: Conjunctivae normal    Neck:      Musculoskeletal: Neck supple  Thyroid: No thyromegaly  Vascular: No carotid bruit  Cardiovascular:      Rate and Rhythm: Normal rate and regular rhythm        Heart sounds: Normal heart sounds  No murmur  Pulmonary:      Effort: Pulmonary effort is normal  No respiratory distress  Breath sounds: Normal breath sounds  No wheezing  Abdominal:      General: Bowel sounds are normal  There is no distension  Palpations: Abdomen is soft  There is no mass  Tenderness: There is no abdominal tenderness  There is no guarding or rebound  Musculoskeletal: Normal range of motion  General: No tenderness  Lymphadenopathy:      Cervical: No cervical adenopathy  Skin:     General: Skin is warm and dry  Findings: No erythema or rash  Neurological:      Mental Status: She is alert and oriented to person, place, and time  Psychiatric:         Behavior: Behavior normal  Behavior is cooperative  Thought Content:  Thought content normal          Judgment: Judgment normal

## 2021-03-17 ENCOUNTER — IMMUNIZATIONS (OUTPATIENT)
Dept: FAMILY MEDICINE CLINIC | Facility: HOSPITAL | Age: 66
End: 2021-03-17

## 2021-03-17 DIAGNOSIS — Z23 ENCOUNTER FOR IMMUNIZATION: Primary | ICD-10-CM

## 2021-03-17 PROCEDURE — 0011A SARS-COV-2 / COVID-19 MRNA VACCINE (MODERNA) 100 MCG: CPT

## 2021-03-17 PROCEDURE — 91301 SARS-COV-2 / COVID-19 MRNA VACCINE (MODERNA) 100 MCG: CPT

## 2021-03-22 ENCOUNTER — TELEPHONE (OUTPATIENT)
Dept: FAMILY MEDICINE CLINIC | Facility: CLINIC | Age: 66
End: 2021-03-22

## 2021-03-22 NOTE — TELEPHONE ENCOUNTER
Patient would like Continuing box of Chantix  Said she should have asked you while she was here but forgot  Already has starting box

## 2021-03-23 DIAGNOSIS — Z72.0 TOBACCO ABUSE: Primary | ICD-10-CM

## 2021-03-23 RX ORDER — VARENICLINE TARTRATE 1 MG/1
1 TABLET, FILM COATED ORAL 2 TIMES DAILY
Qty: 60 TABLET | Refills: 4 | Status: SHIPPED | OUTPATIENT
Start: 2021-03-23

## 2022-01-01 ENCOUNTER — HOSPITAL ENCOUNTER (EMERGENCY)
Facility: HOSPITAL | Age: 67
Discharge: ANOTHER HOSPITAL | End: 2022-01-05
Attending: EMERGENCY MEDICINE
Payer: COMMERCIAL

## 2022-01-01 ENCOUNTER — APPOINTMENT (EMERGENCY)
Dept: CT IMAGING | Facility: HOSPITAL | Age: 67
End: 2022-01-01
Payer: COMMERCIAL

## 2022-01-01 VITALS
TEMPERATURE: 98 F | HEART RATE: 86 BPM | SYSTOLIC BLOOD PRESSURE: 140 MMHG | DIASTOLIC BLOOD PRESSURE: 72 MMHG | OXYGEN SATURATION: 95 % | RESPIRATION RATE: 17 BRPM

## 2022-01-01 DIAGNOSIS — S22.49XA MULTIPLE RIB FRACTURES: ICD-10-CM

## 2022-01-01 DIAGNOSIS — S20.229A CONTUSION OF THORACIC SPINE: ICD-10-CM

## 2022-01-01 DIAGNOSIS — D49.6 BRAIN TUMOR (HCC): Primary | ICD-10-CM

## 2022-01-01 LAB
ALBUMIN SERPL BCP-MCNC: 4.5 G/DL (ref 3.5–5)
ALP SERPL-CCNC: 73 U/L (ref 34–104)
ALT SERPL W P-5'-P-CCNC: 15 U/L (ref 7–52)
ANION GAP SERPL CALCULATED.3IONS-SCNC: 8 MMOL/L (ref 4–13)
APTT PPP: 23 SECONDS (ref 23–37)
AST SERPL W P-5'-P-CCNC: 16 U/L (ref 13–39)
BASOPHILS # BLD AUTO: 0.01 THOUSANDS/ΜL (ref 0–0.1)
BASOPHILS NFR BLD AUTO: 0 % (ref 0–1)
BILIRUB SERPL-MCNC: 0.56 MG/DL (ref 0.2–1)
BUN SERPL-MCNC: 12 MG/DL (ref 5–25)
CALCIUM SERPL-MCNC: 9.9 MG/DL (ref 8.4–10.2)
CHLORIDE SERPL-SCNC: 101 MMOL/L (ref 96–108)
CO2 SERPL-SCNC: 28 MMOL/L (ref 21–32)
CREAT SERPL-MCNC: 0.69 MG/DL (ref 0.6–1.3)
EOSINOPHIL # BLD AUTO: 0.01 THOUSAND/ΜL (ref 0–0.61)
EOSINOPHIL NFR BLD AUTO: 0 % (ref 0–6)
ERYTHROCYTE [DISTWIDTH] IN BLOOD BY AUTOMATED COUNT: 12.5 % (ref 11.6–15.1)
FLUAV RNA RESP QL NAA+PROBE: NEGATIVE
FLUBV RNA RESP QL NAA+PROBE: NEGATIVE
GFR SERPL CREATININE-BSD FRML MDRD: 91 ML/MIN/1.73SQ M
GLUCOSE SERPL-MCNC: 108 MG/DL (ref 65–140)
HCT VFR BLD AUTO: 42 % (ref 34.8–46.1)
HGB BLD-MCNC: 14.1 G/DL (ref 11.5–15.4)
IMM GRANULOCYTES # BLD AUTO: 0.03 THOUSAND/UL (ref 0–0.2)
IMM GRANULOCYTES NFR BLD AUTO: 0 % (ref 0–2)
INR PPP: 0.97 (ref 0.84–1.19)
LYMPHOCYTES # BLD AUTO: 0.71 THOUSANDS/ΜL (ref 0.6–4.47)
LYMPHOCYTES NFR BLD AUTO: 9 % (ref 14–44)
MCH RBC QN AUTO: 34.8 PG (ref 26.8–34.3)
MCHC RBC AUTO-ENTMCNC: 33.6 G/DL (ref 31.4–37.4)
MCV RBC AUTO: 104 FL (ref 82–98)
MONOCYTES # BLD AUTO: 0.46 THOUSAND/ΜL (ref 0.17–1.22)
MONOCYTES NFR BLD AUTO: 6 % (ref 4–12)
NEUTROPHILS # BLD AUTO: 7.09 THOUSANDS/ΜL (ref 1.85–7.62)
NEUTS SEG NFR BLD AUTO: 85 % (ref 43–75)
NRBC BLD AUTO-RTO: 0 /100 WBCS
PLATELET # BLD AUTO: 214 THOUSANDS/UL (ref 149–390)
PMV BLD AUTO: 9.7 FL (ref 8.9–12.7)
POTASSIUM SERPL-SCNC: 4 MMOL/L (ref 3.5–5.3)
PROT SERPL-MCNC: 7.2 G/DL (ref 6.4–8.4)
PROTHROMBIN TIME: 12.8 SECONDS (ref 11.6–14.5)
RBC # BLD AUTO: 4.05 MILLION/UL (ref 3.81–5.12)
RSV RNA RESP QL NAA+PROBE: NEGATIVE
SARS-COV-2 RNA RESP QL NAA+PROBE: NEGATIVE
SODIUM SERPL-SCNC: 137 MMOL/L (ref 135–147)
WBC # BLD AUTO: 8.31 THOUSAND/UL (ref 4.31–10.16)

## 2022-01-01 PROCEDURE — 85025 COMPLETE CBC W/AUTO DIFF WBC: CPT | Performed by: EMERGENCY MEDICINE

## 2022-01-01 PROCEDURE — 85730 THROMBOPLASTIN TIME PARTIAL: CPT | Performed by: EMERGENCY MEDICINE

## 2022-01-01 PROCEDURE — 85610 PROTHROMBIN TIME: CPT | Performed by: EMERGENCY MEDICINE

## 2022-01-01 PROCEDURE — 80053 COMPREHEN METABOLIC PANEL: CPT | Performed by: EMERGENCY MEDICINE

## 2022-01-01 PROCEDURE — 70470 CT HEAD/BRAIN W/O & W/DYE: CPT

## 2022-01-01 PROCEDURE — 71260 CT THORAX DX C+: CPT

## 2022-01-01 PROCEDURE — G1004 CDSM NDSC: HCPCS

## 2022-01-01 PROCEDURE — 96375 TX/PRO/DX INJ NEW DRUG ADDON: CPT

## 2022-01-01 PROCEDURE — 97167 OT EVAL HIGH COMPLEX 60 MIN: CPT

## 2022-01-01 PROCEDURE — 96374 THER/PROPH/DIAG INJ IV PUSH: CPT

## 2022-01-01 PROCEDURE — 99285 EMERGENCY DEPT VISIT HI MDM: CPT | Performed by: EMERGENCY MEDICINE

## 2022-01-01 PROCEDURE — 0241U HB NFCT DS VIR RESP RNA 4 TRGT: CPT | Performed by: EMERGENCY MEDICINE

## 2022-01-01 PROCEDURE — 97163 PT EVAL HIGH COMPLEX 45 MIN: CPT

## 2022-01-01 PROCEDURE — 36415 COLL VENOUS BLD VENIPUNCTURE: CPT | Performed by: EMERGENCY MEDICINE

## 2022-01-01 PROCEDURE — 99285 EMERGENCY DEPT VISIT HI MDM: CPT

## 2022-01-01 RX ORDER — LORAZEPAM 2 MG/ML
1 INJECTION INTRAMUSCULAR ONCE
Status: COMPLETED | OUTPATIENT
Start: 2022-01-01 | End: 2022-01-01

## 2022-01-01 RX ORDER — DEXAMETHASONE SODIUM PHOSPHATE 4 MG/ML
8 INJECTION, SOLUTION INTRA-ARTICULAR; INTRALESIONAL; INTRAMUSCULAR; INTRAVENOUS; SOFT TISSUE ONCE
Status: COMPLETED | OUTPATIENT
Start: 2022-01-01 | End: 2022-01-01

## 2022-01-01 RX ADMIN — IOHEXOL 80 ML: 350 INJECTION, SOLUTION INTRAVENOUS at 14:45

## 2022-01-01 RX ADMIN — LORAZEPAM 1 MG: 2 INJECTION INTRAMUSCULAR; INTRAVENOUS at 20:42

## 2022-01-01 RX ADMIN — DEXAMETHASONE SODIUM PHOSPHATE 8 MG: 4 INJECTION, SOLUTION INTRA-ARTICULAR; INTRALESIONAL; INTRAMUSCULAR; INTRAVENOUS; SOFT TISSUE at 17:29

## 2022-01-05 NOTE — OCCUPATIONAL THERAPY NOTE
Occupational Therapy Evaluation      Julee Purvis    1/5/2022    Patient Active Problem List   Diagnosis    Vocal cord paralysis    Mass of left lung    Tobacco abuse    Convalescence following chemotherapy    [facility]    Primary malignant neoplasm of left upper lobe of lung ([facility] )       Past Medical History:   Diagnosis Date    Mass of left lung        Past Surgical History:   Procedure Laterality Date    SMALL INTESTINE SURGERY      SMALL INTESTINE SURGERY      TUBAL LIGATION          01/05/22 1345   OT Last Visit   OT Visit Date 01/05/22   Note Type   Note type Evaluation   Additional Comments Pt agreeable to OT eval  Upon arrival, pt seated on toilet with RN and  present   Restrictions/Precautions   Weight Bearing Precautions Per Order No   Other Precautions Fall Risk   Pain Assessment   Pain Assessment Tool 0-10   Pain Score No Pain   Home Living   Type of [address] [place] [address] One level;Performs ADLs on one level; Able to live on main level with bedroom/bathroom;Stairs to enter without rails  (3 VIOLET)   Bathroom Shower/Tub Tub/shower unit   Bathroom Toilet Standard   Bathroom Equipment Grab bars in [place] [address]   (no AD used at baseline )   Prior Function   Level of [place] Independent with ADLs and functional mobility   Lives With [facility] Help From Family   ADL Assistance Independent   IADLs Independent  (pt reports cooking,  performs laundry)   Falls in the last 6 months 1 to 4  (1 fall in shower, 1 fall today = 2 falls total)   Vocational Retired   Comments (-) driving, pt's  assists c transportation    Lifestyle   Autonomy Patient reporting being independent with ADLs/IADLs, ambulatory with no AD and lives with  in a one level house    Reciprocal Relationships     Service to Others retired    ADL   Eating Assistance 6  Modified independent   Grooming Assistance 6  Modified Independent   UB Bathing Assistance 4  Minimal Assistance   LB Bathing Assistance 4  Minimal Assistance   UB Dressing Assistance 4  Minimal Assistance   LB Dressing Assistance 3  Moderate [address]  4  Minimal Assistance   Bed Mobility   Supine to Sit   (DNT: pt seated on toilet upon arrival )   Sit to Supine 5  Supervision   Additional items Assist x 1;HOB elevated; Increased time required;Verbal cues   Transfers   Sit to Stand   (CGA)   Additional items Assist x 1; Increased time required;Verbal cues   Stand to Sit   (CGA)   Additional items Assist x 1; Increased time required;Verbal cues   Toilet transfer   (CGA)   Additional items Assist x 1; Increased time required;Verbal cues;Standard toilet  (L grab usage )   Additional Comments Verbal cues provided for proper body mechanics and proper hand placement   Functional Mobility   Functional Mobility   (CGA)   Additional Comments Pt ambulated toilet>sink>bed  Pt unsteady and noted to become SOB with mobility  Verbal cues provided for safety and RW management  Additional items Rolling walker   Balance   Static Sitting Fair +   Dynamic Sitting Fair   Static Standing Fair   Dynamic Standing Fair -   Activity Tolerance   Activity Tolerance Patient limited by fatigue   Medical Staff Made Aware Pt seen as a co-eval with PT due to the patient's co-morbidities, clinically unstable presentation, and present impairments which are a regression from the patient's baseline      Nurse Made Aware RN made aware of outcomes    RUE Assessment   RUE Assessment WFL  (3+/5 MMT)   LUE Assessment   LUE Assessment WFL  (3+/5 MMT)   Hand Function   Gross Motor Coordination Functional   Fine Motor Coordination Functional   Sensation   Light Touch No apparent deficits  (per pt report)   Vision-Basic Assessment   Current Vision Wears glasses all the time   Visual History   (pt reports no decline in vision)   Cognition   Overall Cognitive Status Impaired   Arousal/Participation Alert; Responsive; Cooperative   Attention Within functional limits   Orientation Level Oriented X4   Memory Decreased recall of biographical information;Decreased short term memory  ( reports decreased memory since chemo )   Following Commands Follows one step commands without difficulty   Comments Pt would benefit from formal cognitive assessment to assist with safe discharge    Assessment   Limitation Decreased ADL status; Decreased UE strength;Decreased cognition;Decreased endurance;Decreased self-care trans;Decreased high-level ADLs   Prognosis Good   Assessment Patient is a 77 y o  female seen for OT evaluation s/p admit to [place] [address] on 1/5/2022 w/ fall  Commorbidities affecting patient's functional performance at time of assessment include: tobacco abuse, primary cancer of R lower lobe of lung, primary malignant neoplasm of L upper lobe of lung, and vocal cord paralysis  Orders placed for OT evaluation and treatment  Performed at least two patient identifiers during session including name and wristband  Prior to admission, Patient reporting being independent with ADLs/IADLs, ambulatory with no AD and lives with  in a one level house  Personal factors affecting patient at time of initial evaluation include: steps to enter, difficulty performing ADLs and difficulty performing IADLs  Upon evaluation, patient requires minimal  assist for UB ADLs, minimal  and moderate assist for LB ADLs, transfers and functional ambulation in room and bathroom with contact guard assist, with the use of Rolling Walker  Patient is oriented x 4 and presents with limited ability to recall information after a brief period of time (short term memory) / working memory     Occupational performance is affected by the following deficits: decreased muscle strength, dynamic sit/ stand balance deficit with poor standing tolerance time for self care and functional mobility, decreased activity tolerance, impaired memory, impaired problem solving and delayed righting and equilibrium reactions  Based on the mentioned OT evaluation outcomes, functional performance deficits, and assessment findings, pt has been identified as a high complexity evaluation  Patient to benefit from continued Occupational Therapy treatment while in the hospital to address deficits as defined above and maximize level of functional independence with ADLs and functional mobility  Occupational Performance areas to address include: eating, grooming , bathing/ shower, dressing, toilet hygiene, transfer to all surfaces and functional ambulation  From OT standpoint, recommendation at time of d/c would be Home with home health rehabilitation  Goals   Patient Goals to get stronger    Plan   Treatment Interventions ADL retraining;Functional transfer training;UE strengthening/ROM; Endurance training;Cognitive reorientation;Patient/family training;Equipment evaluation/education; Compensatory technique education; Activityengagement; Energy conservation   Goal Expiration Date 01/15/22   OT Treatment Day 0   OT Frequency 3-5x/wk   Recommendation   OT Discharge Recommendation Home with home health rehabilitation   Equipment Recommended Bedside commode; Shower/Tub chair with back ($)   Commode Type Standard   OT - OK to Discharge Yes  (Once medically cleared )   Additional Comments  At end of eval, pt supine in bed with all needs met and call bell within reach    Additional Comments 2 The patient's raw score on the -PAC Daily Activity inpatient short form is 19, standardized score is 40 22, greater than 39 4  Patients at this level are likely to benefit from discharge to home  Please refer to the recommendation of the Occupational Therapist for safe discharge planning     -PAC Daily Activity Inpatient   Lower Body Dressing 2   Bathing 3   Toileting 3   Upper Body Dressing 3   Grooming 4   Eating 4   Daily Activity Raw Score 19   Daily Activity Standardized Score (Calc for Raw Score >=11) 40 22   AM-PAC Applied Cognition Inpatient   Following a Speech/Presentation 4   Understanding Ordinary Conversation 4   Taking Medications 3   Remembering Where Things Are Placed or Put Away 2   Remembering List of 4-5 Errands 2   Taking Care of Complicated Tasks 2   Applied Cognition Raw Score 17   Applied Cognition Standardized Score 36 52     GOALS:    *ADL transfers with (I) for inc'd independence with ADLs/purposeful tasks    *UB ADL with (I) for inc'd independence with self cares    *LB ADL with (S) using AE prn for inc'd independence with self cares    *Toileting with (I) for clothing management and hygiene for return to PLOF with personal care    *Increase stand tolerance x5 m for inc'd tolerance with standing purposeful tasks    *Participate in 10m UE therex to increase overall stamina/activity tolerance for purposeful tasks    *Bed mobility- (I) for inc'd independence to manage own comfort and initiate EOB & OOB purposeful tasks    *Patient will verbalize 3 safety awareness/ principles to prevent falls in the home setting  *Patient will verbalize and demonstrate use of energy conservation/deep breathing techniques and work simplification skills during functional activities with no verbal cues  *Patient will increase OOB/sitting tolerance to 2-4 hours per day to increase participation in self-care and leisure tasks with no s/s of exertion        *Pt will demonstrate use of long handled AE during 100% of tx sessions for increased ADL safety and independence following D/C     Ada Goodman, OTTITA, OTR/L

## 2022-01-05 NOTE — ED PROVIDER NOTES
History  Chief Complaint   Patient presents with    Fall     Patient now reporting back pain     78-year-old female presents emergency department due to fall  According to the patient's  who is her primary caregiver, she has been weak and falling more frequently since Thanksgiving  The patient has a history of metastatic lung cancer and has had chemo and radiation  Patient states that she has mild back pain today but no loss of consciousness and no headache  The patient's  notes that she has had a significant decline, and does not want her  to call other doctors  He states today was the last straw when he found her in the grass after falling  Patient notes that she lost her balance  Her  notes that she has lost a lot of weight over the last year  Her lung cancer was diagnosed in late 2020, with good results after chemo radiation and early 2021  Patient is supposed to get a CT scan to assess for potential recurrence of her chest in a week but the patient's  states that she has been to weak to be able to get out of the house easily  Prior to Admission Medications   Prescriptions Last Dose Informant Patient Reported? Taking?    St Johns Wort 300 MG CAPS   Yes No   Sig: Take 1 capsule by mouth daily   albuterol (PROVENTIL HFA,VENTOLIN HFA) 90 mcg/act inhaler   Yes No   Sig: Inhale 2 puffs every 6 (six) hours as needed   Patient not taking: Reported on 10/15/2021   buPROPion (WELLBUTRIN SR) 150 mg 12 hr tablet   Yes No   Sig: Take 150 mg by mouth daily   Patient not taking: Reported on 10/15/2021   docusate sodium (Colace) 100 mg capsule   Yes No   Sig: Take 100 mg by mouth 2 (two) times a day   Patient not taking: Reported on 10/5/2021   megestrol (MEGACE ES) 625 MG/5ML suspension   Yes No   Sig: Take 625 mg by mouth daily   Patient not taking: Reported on 10/5/2021   mirtazapine (REMERON) 15 mg tablet   Yes No   Sig: Take 15 mg by mouth   Patient not taking: Reported on 10/5/2021   prochlorperazine (COMPAZINE) 10 mg tablet   Yes No   Sig: Take 10 mg by mouth every 6 (six) hours as needed   Patient not taking: Reported on 10/5/2021   senna (Senna-Time) 8 6 MG tablet   Yes No   Sig: Take 1 tablet by mouth daily   Patient not taking: Reported on 10/5/2021   umeclidinium-vilanterol (ANORO ELLIPTA) 62 5-25 MCG/INH inhaler   Yes No   Sig: Inhale 1 puff daily   Patient not taking: Reported on 10/15/2021   valACYclovir (VALTREX) 1,000 mg tablet   No No   Sig: Take 1 tablet (1,000 mg total) by mouth 3 (three) times a day for 10 days   varenicline (CHANTIX) 0 5 mg tablet   Yes No   Sig: Take 0 5 mg by mouth 2 (two) times a day   Patient not taking: Reported on 10/5/2021   varenicline (Chantix Continuing Month Hernan) 1 mg tablet   No No   Sig: Take 1 tablet (1 mg total) by mouth 2 (two) times a day   Patient not taking: Reported on 10/5/2021      Facility-Administered Medications: None       Past Medical History:   Diagnosis Date    Mass of left lung        Past Surgical History:   Procedure Laterality Date    SMALL INTESTINE SURGERY      SMALL INTESTINE SURGERY      TUBAL LIGATION         Family History   Problem Relation Age of Onset    No Known Problems Mother     Prostate cancer Father     Brain cancer Father     Skin cancer Father     Breast cancer Sister 54    No Known Problems Maternal Grandmother     No Known Problems Maternal Grandfather     Cancer Paternal Grandmother         unknown origin     No Known Problems Paternal Grandfather     No Known Problems Sister     No Known Problems Sister     No Known Problems Sister     No Known Problems Maternal Aunt     No Known Problems Maternal Aunt     Early death Paternal Aunt      I have reviewed and agree with the history as documented      E-Cigarette/Vaping     E-Cigarette/Vaping Substances     Social History     Tobacco Use    Smoking status: Current Every Day Smoker    Smokeless tobacco: Never Used Substance Use Topics    Alcohol use: Yes     Comment: socially    Drug use: No       Review of Systems   Constitutional: Positive for fatigue  Negative for chills and fever  HENT: Negative for ear pain and sore throat  Eyes: Negative for pain and visual disturbance  Respiratory: Negative for cough and shortness of breath  Cardiovascular: Negative for chest pain and palpitations  Gastrointestinal: Negative for abdominal pain and vomiting  Genitourinary: Negative for dysuria and hematuria  Musculoskeletal: Positive for back pain  Negative for arthralgias  Skin: Negative for color change and rash  Neurological: Positive for dizziness and weakness  Negative for seizures, syncope and headaches  All other systems reviewed and are negative  Physical Exam  Physical Exam  Vitals and nursing note reviewed  Constitutional:       General: She is not in acute distress  Appearance: She is well-developed  She is ill-appearing  HENT:      Head: Normocephalic and atraumatic  Nose: Nose normal    Eyes:      Conjunctiva/sclera: Conjunctivae normal    Cardiovascular:      Rate and Rhythm: Normal rate and regular rhythm  Heart sounds: No murmur heard  Pulmonary:      Effort: Pulmonary effort is normal  No respiratory distress  Breath sounds: Normal breath sounds  Abdominal:      Palpations: Abdomen is soft  Tenderness: There is no abdominal tenderness  Musculoskeletal:         General: Tenderness present  Cervical back: Neck supple  Comments: Patient with mild tenderness to the superior middle thoracic spine with palpation   Skin:     General: Skin is warm and dry  Capillary Refill: Capillary refill takes less than 2 seconds  Neurological:      General: No focal deficit present  Mental Status: She is alert     Psychiatric:         Mood and Affect: Mood normal          Vital Signs  ED Triage Vitals   Temperature Pulse Respirations Blood Pressure SpO2 01/05/22 1211 01/05/22 1211 01/05/22 1211 01/05/22 1211 01/05/22 1211   98 °F (36 7 °C) 80 16 154/88 98 %      Temp Source Heart Rate Source Patient Position - Orthostatic VS BP Location FiO2 (%)   01/05/22 1211 01/05/22 1211 01/05/22 1211 01/05/22 1211 --   Oral Monitor Sitting Right arm       Pain Score       01/05/22 1332       No Pain           Vitals:    01/05/22 1211 01/05/22 1804   BP: 154/88 140/72   Pulse: 80 86   Patient Position - Orthostatic VS: Sitting          Visual Acuity      ED Medications  Medications   iohexol (OMNIPAQUE) 350 MG/ML injection (SINGLE-DOSE) 80 mL (80 mL Intravenous Given 1/5/22 1445)   dexamethasone (DECADRON) injection 8 mg (8 mg Intravenous Given 1/5/22 1729)   LORazepam (ATIVAN) injection 1 mg (1 mg Intravenous Given 1/5/22 2042)       Diagnostic Studies  Results Reviewed     Procedure Component Value Units Date/Time    COVID/FLU/RSV [[id number]] Collected: 01/05/22 2105    Lab Status:  In process Specimen: Nares from Nasopharyngeal Swab Updated: 01/05/22 2107    Comprehensive metabolic panel [[id number]] Collected: 01/05/22 1404    Lab Status: Final result Specimen: Blood from Arm, Right Updated: 01/05/22 1431     Sodium 137 mmol/L      Potassium 4 0 mmol/L      Chloride 101 mmol/L      CO2 28 mmol/L      ANION GAP 8 mmol/L      BUN 12 mg/dL      Creatinine 0 69 mg/dL      Glucose 108 mg/dL      Calcium 9 9 mg/dL      AST 16 U/L      ALT 15 U/L      Alkaline Phosphatase 73 U/L      Total Protein 7 2 g/dL      Albumin 4 5 g/dL      Total Bilirubin 0 56 mg/dL      eGFR 91 ml/min/1 73sq m     Narrative:      [place] guidelines for Chronic Kidney Disease (CKD):     Stage 1 with normal or high GFR (GFR > 90 mL/min/1 73 square meters)    Stage 2 Mild CKD (GFR = 60-89 mL/min/1 73 square meters)    Stage 3A Moderate CKD (GFR = 45-59 mL/min/1 73 square meters)    Stage 3B Moderate CKD (GFR = 30-44 mL/min/1 73 square meters)    Stage 4 Severe CKD (GFR = 15-29 mL/min/1 73 square meters)    Stage 5 End Stage CKD (GFR <15 mL/min/1 73 square meters)  Note: GFR calculation is accurate only with a steady state creatinine    Protime-INR [[id number]]  (Normal) Collected: 01/05/22 1404    Lab Status: Final result Specimen: Blood from Arm, Right Updated: 01/05/22 1421     Protime 12 8 seconds      INR 0 97    APTT [[id number]]  (Normal) Collected: 01/05/22 1404    Lab Status: Final result Specimen: Blood from Arm, Right Updated: 01/05/22 1421     PTT 23 seconds     CBC and differential [[id number]]  (Abnormal) Collected: 01/05/22 1404    Lab Status: Final result Specimen: Blood from Arm, Right Updated: 01/05/22 1412     WBC 8 31 Thousand/uL      RBC 4 05 Million/uL      Hemoglobin 14 1 g/dL      Hematocrit 42 0 %       fL      MCH 34 8 pg      MCHC 33 6 g/dL      RDW 12 5 %      MPV 9 7 fL      Platelets 654 Thousands/uL      nRBC 0 /100 WBCs      Neutrophils Relative 85 %      Immat GRANS % 0 %      Lymphocytes Relative 9 %      Monocytes Relative 6 %      Eosinophils Relative 0 %      Basophils Relative 0 %      Neutrophils Absolute 7 09 Thousands/µL      Immature Grans Absolute 0 03 Thousand/uL      Lymphocytes Absolute 0 71 Thousands/µL      Monocytes Absolute 0 46 Thousand/µL      Eosinophils Absolute 0 01 Thousand/µL      Basophils Absolute 0 01 Thousands/µL                  CT head w wo contrast   Final Result by Claribel Zeng MD (01/05 1538)      New multiple (too many to count) hemorrhagic enhancing metastatic lesions in bilateral cerebral hemispheres and bilateral cerebellum with marked surrounding perilesional vasogenic edema  The study was marked in [facility] for immediate notification  Workstation performed: [id number]         CT chest with contrast   Final Result by Roslyn Richardson DO (01/05 1608)   1  Redemonstration of cavitary nodule within the right lower lobe in keeping with patient's history of primary lung neoplasm    This is decreased in size from prior exam of November 2020  There is a new wedge-shaped consolidation along the adjacent    pulmonary fissure may represent posttreatment changes/scarring  Close attention on follow-up is recommended  2   Soft tissue mass/adenopathy involving the left aspect of the mediastinum is essentially resolved from CT of November 2020 and there is markedly improved aeration of the left upper lobe  There is persistent partial occlusion of several left upper    lobe bronchi which may represent residual endobronchial tumor versus mucous plugging  3   Adjacent right upper lobe pulmonary nodules measuring up to 5 mm, new from prior study and highly suspicious for pulmonary metastasis  There is additionally an area of pleural-based scarring within the left upper lobe which demonstrates a nodular    component measuring up to 1 3 cm   close attention on follow-up is recommended  These findings may also be evaluated utilizing PET CT, however, the right upper lobe nodules are below the size sensitivity for that exam       4   Heterogeneous left adrenal nodule measuring 1 9 cm, highly suspicious for intra-abdominal metastasis  5   Nondisplaced fractures involving ribs 4 through 6 on the right which appear acute/subacute  There is also a compression deformity of the T3 vertebral body with with 50% height loss and focal buckling of the posterior cortex which extends    approximately 2 mm posteriorly along the middle  This is age indeterminate but new from prior exam, correlation with point tenderness is recommended  6   Ill-defined lingular groundglass opacity of indeterminate etiology as well as fissural thickening within the left lung apex as listed above, close attention on follow-up is recommended  7   Circumferential wall thickening of the midesophagus suspicious for esophagitis        I personally discussed this study with Seth Slaughter on 1/5/2022 at 4:07 PM  Workstation performed: [id number]                    Procedures  Procedures         ED Course  ED Course as of 01/05/22 2110 Wed Jan 05, 2022   1522 Patient evaluated by PT and OT  They feel the patient needs a walker and can do home care  Case management contacted to try to coordinate home care for this patient  1717 Discussed case with Radiation Oncology, Dr Wesley Ng, who notes that the patient should have a neurology consult and have the case run by Neurosurgery  Case signed out to Dr Reva Cheng pending evaluations by Neurology and input from neurosurgery  MDM    Disposition  Final diagnoses:   Brain tumor ([facility] )   Contusion of thoracic spine   Multiple rib fractures     Time reflects when diagnosis was documented in both MDM as applicable and the Disposition within this note     Time User Action Codes Description Comment    1/5/2022  5:39 PM Reda Ceci Add [D49 6] Brain tumor ([facility] )     1/5/2022  9:10 PM BruMaki tellez Add [S20 229A] Contusion of thoracic spine     1/5/2022  9:10 PM Angeli Glass [H47 [id number]] Multiple rib fractures       ED Disposition     ED Disposition Condition Date/Time Comment    Transfer to Another [address] [place] [address]  Wed Jan 5, 2022  8:56 PM Callie Franklinuphin should be transferred out to [place]         Follow-up Information     Follow up With Specialties Details Why [address] [place] [address]  Follow up A representative from [facility] will contact you to begin services  [address] [place] [id number] [phone]          Patient's Medications   Discharge Prescriptions    No medications on file       No discharge procedures on file      PDMP Review     None          ED Provider  Electronically Signed by           Debibe Christiansen DO  01/05/22 2112

## 2022-01-05 NOTE — ED NOTES
Pt ambulatory to restroom to change into gown and urinate  PT/OT russellal began in restroom       Prince De Dios RN  01/05/22 5175

## 2022-01-05 NOTE — PHYSICAL THERAPY NOTE
Physical Therapy Evaluation   Time in: 1325  Time out: 1343  Total evaluation time: 18 minutes    Patient's Name: Sukhdeep Johnson    Admitting Diagnosis  Back pain [M54 9]    Problem List  Patient Active Problem List   Diagnosis    Vocal cord paralysis    Mass of left lung    Tobacco abuse    Convalescence following chemotherapy    Primary cancer of right lower lobe of lung ([facility] )    Primary malignant neoplasm of left upper lobe of lung ([facility] )       Past Medical History  Past Medical History:   Diagnosis Date    Mass of left lung        Past Surgical History  Past Surgical History:   Procedure Laterality Date    SMALL INTESTINE SURGERY      SMALL INTESTINE SURGERY      TUBAL LIGATION         PT performed at least 2 patient identifiers during session: Name and wristband  01/05/22 1332   PT Last Visit   PT Visit Date 01/05/22   Note Type   Note type Evaluation   Pain Assessment   Pain Assessment Tool 0-10   Pain Score No Pain   Restrictions/Precautions   Weight Bearing Precautions Per Order No   Other Precautions Fall Risk   Home Living   Type of [address] [place] [address] One level;Performs ADLs on one level; Able to live on main level with bedroom/bathroom;Stairs to enter without rails  (3 VIOLET)   Bathroom Shower/Tub Tub/shower unit   Bathroom Toilet Standard   Bathroom Equipment Grab bars in [place] [id number]   (no AD used at baseline)   Prior Function   Level of [place] Independent with ADLs and functional mobility   [facility] From Family   ADL Assistance Independent   IADLs Independent  (pt reports cooking,  performs laundry)   Falls in the last 6 months 1 to 4  (1 fall in shower, 1 fall today = 2 falls total)   Vocational Retired   Comments pt's  performs driving   General   Family/Caregiver Present Yes  (pt's )   Cognition   Arousal/Participation Alert   Orientation Level Oriented X4   Memory Within functional limits   Following Commands Follows all commands and directions without difficulty   Comments pt agreeable to PT session   Subjective   Subjective "I feel ok"   RLE Assessment   RLE Assessment X   Strength RLE   RLE Overall Strength 4-/5   LLE Assessment   LLE Assessment X   Strength LLE   LLE Overall Strength 4-/5   Vision-Basic Assessment   Current Vision Wears glasses all the time   Visual History   (pt reports no decline in vision)   Coordination   Movements are Fluid and Coordinated 1   Sensation WFL  (pt denies numbness/tingling)   Bed Mobility   Supine to Sit Unable to assess  ( DNT: pt seated on toilet upon arrival )   Sit to Supine 5  Supervision   Additional items Assist x 1;HOB elevated; Increased time required   Transfers   Sit to Stand   (CGA)   Additional items Assist x 1; Increased time required; Other   Stand to Sit   (CGA)   Additional items Assist x 1; Increased time required;Verbal cues   Toilet transfer   (CGA)   Additional items Assist x 1; Increased time required;Standard toilet;Verbal cues   Additional Comments Verbal cues provided for proper body mechanics and proper hand placement   Ambulation/Elevation   Gait pattern Improper Weight shift;Decreased foot clearance; Short stride; Excessively slow   Gait Assistance   (CGA)   Additional items Assist x 1   Assistive Device Rolling walker   Distance 20 ft   Stair Management Assistance Not tested   Balance   Static Sitting Fair +   Dynamic Sitting Fair   Static Standing Fair   Dynamic Standing Fair -   Ambulatory Fair -   Endurance Deficit   Endurance Deficit Yes   Activity Tolerance   Activity Tolerance Patient limited by fatigue   Medical Staff Made Aware Pt seen as a co-eval with OT due to the patient's co-morbidities, clinically unstable presentation, and present impairments which are a regression from the patient's baseline  Nurse Made Aware Yes, CARLOS Martinez made aware of outcomes/recs   ED provider Dr Alves [facility] and CM made aware of outcomes/recs Assessment   Prognosis Good   Problem List Decreased strength;Decreased endurance; Impaired balance;Decreased mobility   Assessment Pt is 77 y o  female seen for high-complexity PT evaluation on 1/5/2022 s/p admit to [facility] on 1/5/2022 w/ s/p fall  PT was consulted to assess pt's functional mobility and d/c needs  Order placed for PT eval and tx  PTA, pt lives in [facility], mobilizes s AD at baseline, 2 falls recently, (I) ADLs/IADLs  At time of eval, pt requires SBA/CGA for all mobility tasks, pt's  present at bedside throughout session  Upon evaluation, pt presenting with impaired functional mobility d/t decreased strength, decreased endurance, impaired balance and decreased mobility  Pertinent PMHx and current co-morbidities affecting pt's physical performance at time of assessment include: lung cancer, tobacco abuse, vocal cord paralysis, h/o falls  Personal factors affecting pt at time of eval include: positive fall history, decreased initiation and engagement and limited insight into impairments  The following objective measures performed on IE also reveal limitations: AM-PAC 6-Clicks: 62/34  Pt's clinical presentation is currently unstable/unpredictable seen in pt's presentation of ongoing imaging studies pending including CT head, CT chest  Pt also has continued generalized weakness and difficulty managing ADLS/IADLs  Overall, pt's rehab potential and prognosis to return to PLOF is good as impacted by objective findings, warranting pt to receive further skilled PT interventions to address identified impairments, activity limitation(s), and participation restriction(s)  Goal for patient is to get stronger  Pt to benefit from continued PT tx to address deficits as defined above and maximize level of functional independent mobility and consistency in order for pt to improve activity tolerance   From PT/mobility standpoint, recommendation at time of d/c would be home with home health rehabilitation pending progress in order to facilitate return to PLOF  Barriers to Discharge None   Goals   Patient Goals "to get stronger"   STG Expiration Date 01/15/22   Short Term Goal #1 In 7-10 days: Increase bilateral LE strength 1/2 grade to facilitate independent mobility, Perform all bed mobility tasks independently to decrease caregiver burden, Perform all transfers modified independent to improve independence, Ambulate > 100 ft  with least restrictive assistive device modified independent w/o LOB and w/ normalized gait pattern 100% of the time, Navigate 3 stairs modified independent with unilateral handrail to facilitate return to previous living environment, Increase all balance 1/2 grade to decrease risk for falls and Complete exercise program independently   PT Treatment Day 0   Plan   Treatment/Interventions Functional transfer training;LE strengthening/ROM; Elevations; Therapeutic exercise; Endurance training;Patient/family training;Equipment eval/education;Gait training;Spoke to nursing;Spoke to case management;Spoke to MD;OT   PT Frequency 2-3x/wk   Recommendation   PT Discharge Recommendation Home with home health rehabilitation   Equipment Recommended Walker   Additional Comments Pt's raw score on the [facility] Basic Mobility inpatient short form is 19, standardized score is 42 48  Patients at this level are likely to benefit from DC to home with home care, however, please refer to therapist recommendation for safe DC planning     [facility] Basic Mobility Inpatient   Turning in Bed Without Bedrails 4   Lying on Back to Sitting on Edge of Flat Bed 4   Moving Bed to Chair 3   Standing Up From Chair 3   Walk in Room 3   Climb 3-5 Stairs 2   Basic Mobility Inpatient Raw Score 19   Basic Mobility Standardized Score 42 48   Highest Level Of Mobility   JH-HLM Goal 6: Walk 10 steps or more   JH-HLM Highest Level of Mobility 6: Walk 10 steps or more   JH-HLM Goal Achieved Yes   End of Consult   Patient Position at End of Consult Supine; All needs within reach       Donna Treviño, PT, DPT

## 2022-01-05 NOTE — PLAN OF CARE
Problem: PHYSICAL THERAPY ADULT  Goal: Performs mobility at highest level of function for planned discharge setting  See evaluation for individualized goals  Description: Treatment/Interventions: Functional transfer training,LE strengthening/ROM,Elevations,Therapeutic exercise,Endurance training,Patient/family training,Equipment eval/education,Gait training,Spoke to nursing,Spoke to case management,Spoke to MD,OT  Equipment Recommended: Anthony Markham       See flowsheet documentation for full assessment, interventions and recommendations  Note: Prognosis: Good  Problem List: Decreased strength,Decreased endurance,Impaired balance,Decreased mobility  Assessment: Pt is 77 y o  female seen for high-complexity PT evaluation on 1/5/2022 [facility] w/ s/p fall  PT was consulted to assess pt's functional mobility and d/c needs  Order placed for PT eval and tx  PTA, pt lives in [address] [place] [address], mobilizes s AD at baseline, 2 falls recently, (I) ADLs/IADLs  At time of eval, pt requires SBA/CGA for all mobility tasks, pt's  present at bedside throughout session  Upon evaluation, pt presenting with impaired functional mobility d/t decreased strength, decreased endurance, impaired balance and decreased mobility  Pertinent PMHx and current co-morbidities affecting pt's physical performance at time of assessment include: lung cancer, tobacco abuse, vocal cord paralysis, h/o falls  Personal factors affecting pt at time of eval include: positive fall history, decreased initiation and engagement and limited insight into impairments  The following objective measures performed on IE also reveal limitations: AM-PAC 6-Clicks: 29/96  Pt's clinical presentation is currently unstable/unpredictable seen in pt's presentation of ongoing imaging studies pending including CT head, CT chest  Pt also has continued generalized weakness and difficulty managing ADLS/IADLs   Overall, pt's rehab potential and prognosis to return to PLOF is good as impacted by objective findings, warranting pt to receive further skilled PT interventions to address identified impairments, activity limitation(s), and participation restriction(s)  Goal for patient is to get stronger  Pt to benefit from continued PT tx to address deficits as defined above and maximize level of functional independent mobility and consistency in order for pt to improve activity tolerance  From PT/mobility standpoint, recommendation at time of d/c would be home with home health rehabilitation pending progress in order to facilitate return to PLOF  Barriers to Discharge: None        PT Discharge Recommendation: (S) Home with home health rehabilitation          See flowsheet documentation for full assessment

## 2022-01-05 NOTE — PLAN OF CARE
Problem: OCCUPATIONAL THERAPY ADULT  Goal: Performs self-care activities at highest level of function for planned discharge setting  See evaluation for individualized goals  Description: Treatment Interventions: ADL retraining,Functional transfer training,UE strengthening/ROM,Endurance training,Cognitive reorientation,Patient/family training,Equipment evaluation/education,Compensatory technique education,Activityengagement,Energy conservation  Equipment Recommended: Bedside commode,Shower/Tub chair with back ($)       See flowsheet documentation for full assessment, interventions and recommendations  Note: Limitation: Decreased ADL status,Decreased UE strength,Decreased cognition,Decreased endurance,Decreased self-care trans,Decreased high-level ADLs  Prognosis: Good  Assessment: Patient is a 77 y o  female seen for OT evaluation s/p admit to [facility] on 1/5/2022 w/ fall  Commorbidities affecting patient's functional performance at time of assessment include: tobacco abuse, primary cancer of R lower lobe of lung, primary malignant neoplasm of L upper lobe of lung, and vocal cord paralysis  Orders placed for OT evaluation and treatment  Performed at least two patient identifiers during session including name and wristband  Prior to admission, Patient reporting being independent with ADLs/IADLs, ambulatory with no AD and lives with  in a one level house  Personal factors affecting patient at time of initial evaluation include: steps to enter, difficulty performing ADLs and difficulty performing IADLs  Upon evaluation, patient requires minimal  assist for UB ADLs, minimal  and moderate assist for LB ADLs, transfers and functional ambulation in room and bathroom with contact guard assist, with the use of Rolling Walker  Patient is oriented x 4 and presents with limited ability to recall information after a brief period of time (short term memory) / working memory     Occupational performance is affected by the following deficits: decreased muscle strength, dynamic sit/ stand balance deficit with poor standing tolerance time for self care and functional mobility, decreased activity tolerance, impaired memory, impaired problem solving and delayed righting and equilibrium reactions  Based on the mentioned OT evaluation outcomes, functional performance deficits, and assessment findings, pt has been identified as a high complexity evaluation  Patient to benefit from continued Occupational Therapy treatment while in the hospital to address deficits as defined above and maximize level of functional independence with ADLs and functional mobility  Occupational Performance areas to address include: eating, grooming , bathing/ shower, dressing, toilet hygiene, transfer to all surfaces and functional ambulation  From OT standpoint, recommendation at time of d/c would be Home with home health rehabilitation        OT Discharge Recommendation: Home with home health rehabilitation  OT - OK to Discharge: Yes (Once medically cleared )     Smitha Thakur, OT

## 2022-01-05 NOTE — CASE MANAGEMENT
Case Management Assessment & Discharge Planning Note    Patient name Celestino Novak  Location Z2 H3/Z2 H3 MRN [id number]  : 1955 Date 2022       Current Admission Date: 2022  Current Admission Diagnosis:Back pain   Patient Active Problem List    Diagnosis Date Noted    Convalescence following chemotherapy 2020    Primary cancer of right lower lobe of lung ([facility] ) 2020    Primary malignant neoplasm of left upper lobe of lung ([facility] ) 2020    Vocal cord paralysis 2020    Mass of left lung 2020    Tobacco abuse 2020      LOS (days): 0  Geometric Mean LOS (GMLOS) (days):   Days to GMLOS:     OBJECTIVE:              Current admission status: Emergency  Referral Reason: [facility]    Preferred Pharmacy:   [facility] [address] [place], [address] [address]  Phone: [phone] Fax: [phone]    [address]  [place] [address]  [place] [address] [address]  Phone: [phone] Fax: [phone]    Primary Care Provider: Bety Navarro PA-C    Primary Insurance: [facility]  Secondary Insurance:     ASSESSMENT:  [address] Agents    There are no active Health Care Agents on file  Patient Information  Admitted from[de-identified] Home  Mental Status: Alert  During Assessment patient was accompanied by: Spouse  Assessment information provided by[de-identified] Patient,Parent  Primary Caregiver: Self  Support Systems: [facility] of Residence: [address] do you live in?: [place] entry access options   Select all that apply : Stairs  Number of steps to enter home : 3  Type of Current Residence: Other (Comment) (1 story home)  In the last 12 months, was there a time when you were not able to pay the mortgage or rent on time?: No  In the last 12 months, how many places have you lived?: 1  In the last 12 months, was there a time when you did not have a steady place to sleep or slept in a shelter (including now)?: No  Homeless/housing insecurity resource given?: N/A  Living Arrangements: Lives w/ Spouse/significant other  Is patient a ?: No    Activities of Daily Living Prior to Admission  Functional Status: Independent  Completes ADLs independently?: Yes  Ambulates independently?: Yes  Does patient use assisted devices?: No  Does patient currently own DME?: No  Does patient have a history of Outpatient Therapy (PT/OT)?: No  Does the patient have a history of Short-Term Rehab?: No  Does patient have a history of HHC?: No  Does patient currently have [address]?: No         Patient Information Continued  Income Source: Pension/senior living  Does patient have prescription coverage?: Yes  Within the past 12 months, you worried that your food would run out before you got the money to buy more : Never true  Within the past 12 months, the food you bought just didnt last and you didnt have money to get more : Never true  Food insecurity resource given?: N/A  Does patient receive dialysis treatments?: No  Does patient have a history of substance abuse?: No  Does patient have a history of Mental Health Diagnosis?: No         Means of Transportation  Means of Transport to Appts[de-identified] Family transport  In the past 12 months, has lack of transportation kept you from medical appointments or from getting medications?: No  In the past 12 months, has lack of transportation kept you from meetings, work, or from getting things needed for daily living?: No  Was application for public transport provided?: N/A        DISCHARGE DETAILS:    Discharge planning discussed with[de-identified] Patient and spouse  Freedom of Choice: Yes  Comments - Freedom of Choice: [facility] recommended  Pt and spouse Jeanette James aware and in agreement  They chose [facility] and referral was sent  They are unable to accept  Pt and spouse chose [facility] and they are able to accept    CM contacted family/caregiver?: Yes  Were Treatment Team discharge recommendations reviewed with patient/caregiver?: Yes  Did patient/caregiver verbalize understanding of patient care needs?: Yes  Were patient/caregiver advised of the risks associated with not following Treatment Team discharge recommendations?: Yes    Contacts  Patient Contacts: Kirsten Bender  - spouse  Relationship to Patient[de-identified] Family  Contact Method:  In Person  Reason/Outcome: Discharge [address]         Is the patient interested in [facility] at discharge?: Yes  [address] requested[de-identified] Physical [address] Name[de-identified] [address] Provider[de-identified] PCP  Home Health Services Needed[de-identified] Strengthening/Theraputic Exercises to Improve Function,Evaluate Functional Status and Safety,Gait/ADL Training  Homebound Criteria Met[de-identified] Requires the Assistance of Another Person for Safe Ambulation or to Leave the Home  Supporting Clincal Findings[de-identified] Fatigues Easliy in Short Distances,Limited Endurance    DME Referral Provided  Referral made for DME?: Yes (CM provided pt with rolling walker as recommended by therapy)  DME referral completed for the following items[de-identified] Melina Aguilar  DME Supplier Name[de-identified] [employer]    Other Referral/Resources/Interventions Provided:  Interventions: [facility]    Would you like to participate in our [address] service program?  : No - Declined          Transport at Discharge : Family

## 2022-01-06 NOTE — ED CARE HANDOFF
Emergency Department Sign Out Note        Sign out and transfer of care from Dr Philippe Centeno  See Separate Emergency Department note  The patient, Ching Hidalgo, was evaluated by the previous provider for extensive metastatic disease of the brain  Workup Completed:  CT head and chest   CBC, BMP    ED Course / Workup Pending (followup):  68-year-old female with extensive metastatic disease hemorrhagic Mets too numerous to count  Multiple consult ordered including Neurology, neuro surgery, neuro critical care  Nerve critical care discussed with neuro surgery  I discussed the case with Dr Poncho Daley of Neurology  Patient has grim prognosis  This was related to her and her significant other  They would like to continue care with their Oncology team from [facility]  Transfer order placed  Patient has been accepted by the medical service at [facility]  High-dose IV steroids started  ED Course as of 01/05/22 2223 Wed Jan 05, 2022   1703 Brain mets, case discussed with primary oncologist  Waiting for callback  Should stay for ambulatory dysfunction secondary to brain mets  5065 Neuro consult in    1957 Patient would like to stay in [facility], attempting to arrange transfer        Procedures  MDM        Disposition  Final diagnoses:   Brain tumor ([facility] [address] )   Contusion of thoracic spine   Multiple rib fractures     Time reflects when diagnosis was documented in both MDM as applicable and the Disposition within this note     Time User Action Codes Description Comment    1/5/2022  5:39 PM Ginny Sosa Add [D49 6] Brain tumor ([address] )     1/5/2022  9:10 PM Lela Glass Add [S20 229A] Contusion of thoracic spine     1/5/2022  9:10 PM Jeannie Glass [V12 98NB] Multiple rib fractures       ED Disposition     ED Disposition Condition Date/Time Comment    Transfer to Another [address] [facility]  Wed Jan 5, 2022  8:56 PM James Sigala Emily should be transferred out to [place]         Follow-up Information     Follow up With Specialties Details Why [address]  Follow up A representative from [facility] will contact you to begin services  [address] [address] [phone]        Patient's Medications   Discharge Prescriptions    No medications on file     No discharge procedures on file         ED Provider  Electronically Signed by     Renée Tang MD  01/05/22 8749

## 2022-01-06 NOTE — EMTALA/ACUTE CARE TRANSFER
[address] [address] [place] [address]  Dept: [phone]      EMTALA TRANSFER CONSENT    NAME Vergil Gosselin                                         1955                              MRN [id number]    I have been informed of my rights regarding examination, treatment, and transfer   by Dr Alex Benjamin MD    Benefits:      Risks:        Consent for Transfer:  I acknowledge that my medical condition has been evaluated and explained to me by the emergency department physician or other qualified medical person and/or my attending physician, who has recommended that I be transferred to the service of    at    The above potential benefits of such transfer, the potential risks associated with such transfer, and the probable risks of not being transferred have been explained to me, and I fully understand them  The doctor has explained that, in my case, the benefits of transfer outweigh the risks  I agree to be transferred  I authorize the performance of emergency medical procedures and treatments upon me in both transit and upon arrival at the receiving facility  Additionally, I authorize the release of any and all medical records to the receiving facility and request they be transported with me, if possible  I understand that the safest mode of transportation during a medical emergency is an ambulance and that the Hospital advocates the use of this mode of transport  Risks of traveling to the receiving facility by car, including absence of medical control, life sustaining equipment, such as oxygen, and medical personnel has been explained to me and I fully understand them  (SMILEY CORRECT BOX BELOW)  [  ]  I consent to the stated transfer and to be transported by ambulance/helicopter  [  ]  I consent to the stated transfer, but refuse transportation by ambulance and accept full responsibility for my transportation by car    I understand the risks of non-ambulance transfers and I exonerate the Hospital and its staff from any deterioration in my condition that results from this refusal     X___________________________________________    DATE  22  TIME________  Signature of patient or legally responsible individual signing on patient behalf           RELATIONSHIP TO PATIENT_________________________          Provider Certification    NAME Michael Thakur                                         1955                              MRN [id number]    A medical screening exam was performed on the above named patient  Based on the examination:    Condition Necessitating Transfer The primary encounter diagnosis was Brain tumor ([facility] [address] )  Diagnoses of Contusion of thoracic spine and Multiple rib fractures were also pertinent to this visit  Patient Condition:      Reason for Transfer:      Transfer Requirements: Facility     · Space available and qualified personnel available for treatment as acknowledged by    · Agreed to accept transfer and to provide appropriate medical treatment as acknowledged by          · Appropriate medical records of the examination and treatment of the patient are provided at the time of transfer   [facility], [address] _______  · Transfer will be performed by qualified personnel from    and appropriate transfer equipment as required, including the use of necessary and appropriate life support measures      Provider Certification: I have examined the patient and explained the following risks and benefits of being transferred/refusing transfer to the patient/family:         Based on these reasonable risks and benefits to the patient and/or the unborn child(sada), and based upon the information available at the time of the patients examination, I certify that the medical benefits reasonably to be expected from the provision of appropriate medical treatments at another medical facility outweigh the increasing risks, if any, to the individuals medical condition, and in the case of labor to the unborn child, from effecting the transfer      X____________________________________________ DATE 01/05/22        TIME_______      ORIGINAL - SEND TO MEDICAL RECORDS   COPY - SEND WITH PATIENT DURING TRANSFER